# Patient Record
Sex: FEMALE | Race: WHITE | NOT HISPANIC OR LATINO | Employment: FULL TIME | URBAN - METROPOLITAN AREA
[De-identification: names, ages, dates, MRNs, and addresses within clinical notes are randomized per-mention and may not be internally consistent; named-entity substitution may affect disease eponyms.]

---

## 2018-02-09 ENCOUNTER — OFFICE VISIT (OUTPATIENT)
Dept: URGENT CARE | Facility: CLINIC | Age: 28
End: 2018-02-09
Payer: COMMERCIAL

## 2018-02-09 VITALS
OXYGEN SATURATION: 98 % | WEIGHT: 120 LBS | HEIGHT: 62 IN | TEMPERATURE: 99.9 F | DIASTOLIC BLOOD PRESSURE: 62 MMHG | HEART RATE: 74 BPM | RESPIRATION RATE: 15 BRPM | BODY MASS INDEX: 22.08 KG/M2 | SYSTOLIC BLOOD PRESSURE: 100 MMHG

## 2018-02-09 DIAGNOSIS — B34.9 ACUTE VIRAL SYNDROME: ICD-10-CM

## 2018-02-09 DIAGNOSIS — R52 BODY ACHES: ICD-10-CM

## 2018-02-09 LAB
FLUAV+FLUBV AG SPEC QL IA: NORMAL
INT CON NEG: NEGATIVE
INT CON POS: POSITIVE

## 2018-02-09 PROCEDURE — 87804 INFLUENZA ASSAY W/OPTIC: CPT | Performed by: NURSE PRACTITIONER

## 2018-02-09 PROCEDURE — 99202 OFFICE O/P NEW SF 15 MIN: CPT | Performed by: NURSE PRACTITIONER

## 2018-02-09 RX ORDER — DOXYCYCLINE HYCLATE 100 MG
100 TABLET ORAL 2 TIMES DAILY
Status: ON HOLD | COMMUNITY
End: 2022-12-28

## 2018-02-09 ASSESSMENT — ENCOUNTER SYMPTOMS
SHORTNESS OF BREATH: 0
DIARRHEA: 0
FEVER: 0
NAUSEA: 0
WHEEZING: 0
COUGH: 0
ORTHOPNEA: 0
EYE DISCHARGE: 0
CHILLS: 0
SORE THROAT: 1
MYALGIAS: 1
HEADACHES: 0

## 2018-02-09 ASSESSMENT — PATIENT HEALTH QUESTIONNAIRE - PHQ9: CLINICAL INTERPRETATION OF PHQ2 SCORE: 0

## 2018-02-10 NOTE — PROGRESS NOTES
Subjective:      Magaly Baker is a 27 y.o. female who presents with Flu Like Symptoms (fatigue, body aches, headaches,chills x1days)            HPI New problem. 27 year old female with fatigue and body aches for one day. She states she also has mild congestion. Denies cough, nausea or diarrhea. She has some sore throat in the morning. She took an advil PM last night.  Patient has no allergy information on record.  Current Outpatient Prescriptions on File Prior to Visit   Medication Sig Dispense Refill   • DiphenhydrAMINE HCl (BENADRYL ALLERGY PO) Take  by mouth.     • amoxicillin-clavulanate (AUGMENTIN) 875-125 MG TABS Take 1 Tab by mouth every 12 hours. Take with food. 20 Each 0   • fluticasone (FLONASE) 50 MCG/ACT nasal spray Spray 2 Sprays in nose every day. 1 Bottle 0   • Saline (SIMPLY SALINE) 0.9 % AERS Spray 2 Sprays in nose 4 times a day as needed. 1 Can prn     No current facility-administered medications on file prior to visit.      Social History     Social History   • Marital status: Single     Spouse name: N/A   • Number of children: N/A   • Years of education: N/A     Occupational History   • Not on file.     Social History Main Topics   • Smoking status: Never Smoker   • Smokeless tobacco: Never Used   • Alcohol use No   • Drug use: No   • Sexual activity: Not on file     Other Topics Concern   • Not on file     Social History Narrative   • No narrative on file     family history is not on file.      Review of Systems   Constitutional: Positive for malaise/fatigue. Negative for chills and fever.   HENT: Positive for congestion and sore throat.    Eyes: Negative for discharge.   Respiratory: Negative for cough, shortness of breath and wheezing.    Cardiovascular: Negative for chest pain and orthopnea.   Gastrointestinal: Negative for diarrhea and nausea.   Musculoskeletal: Positive for myalgias.   Neurological: Negative for headaches.   Endo/Heme/Allergies: Negative for environmental allergies.  "         Objective:     /62   Pulse 74   Temp 37.7 °C (99.9 °F)   Resp 15   Ht 1.575 m (5' 2\")   Wt 54.4 kg (120 lb)   SpO2 98%   BMI 21.95 kg/m²      Physical Exam   Constitutional: She is oriented to person, place, and time. She appears well-developed and well-nourished. No distress.   HENT:   Head: Normocephalic and atraumatic.   Right Ear: External ear and ear canal normal. Tympanic membrane is not injected and not perforated. No middle ear effusion.   Left Ear: External ear and ear canal normal. Tympanic membrane is not injected and not perforated.  No middle ear effusion.   Nose: No mucosal edema.   Mouth/Throat: No oropharyngeal exudate or posterior oropharyngeal erythema.   Eyes: Conjunctivae are normal. Right eye exhibits no discharge. Left eye exhibits no discharge.   Neck: Normal range of motion. Neck supple.   Cardiovascular: Normal rate, regular rhythm and normal heart sounds.    No murmur heard.  Pulmonary/Chest: Effort normal and breath sounds normal. No respiratory distress.   Musculoskeletal: Normal range of motion.   Normal movement of all 4 extremities.   Lymphadenopathy:     She has no cervical adenopathy.        Right: No supraclavicular adenopathy present.        Left: No supraclavicular adenopathy present.   Neurological: She is alert and oriented to person, place, and time. Gait normal.   Skin: Skin is warm and dry.   Psychiatric: She has a normal mood and affect. Her behavior is normal. Thought content normal.   Nursing note and vitals reviewed.              Assessment/Plan:     1. Acute viral syndrome     2. Body aches  POCT Influenza A/B     Negative flu  Viral syndrome  Symptomatic care  Differential diagnosis, natural history, supportive care, and indications for immediate follow-up discussed at length.         "

## 2018-11-14 ENCOUNTER — HOSPITAL ENCOUNTER (OUTPATIENT)
Facility: MEDICAL CENTER | Age: 28
End: 2018-11-14
Payer: COMMERCIAL

## 2018-11-15 LAB
CHOLEST SERPL-MCNC: 174 MG/DL (ref 100–199)
FASTING STATUS PATIENT QL REPORTED: NORMAL
GLUCOSE SERPL-MCNC: 75 MG/DL (ref 65–99)
HDLC SERPL-MCNC: 51 MG/DL
LDLC SERPL CALC-MCNC: 114 MG/DL
TRIGL SERPL-MCNC: 45 MG/DL (ref 0–149)

## 2019-09-05 ENCOUNTER — OFFICE VISIT (OUTPATIENT)
Dept: URGENT CARE | Facility: CLINIC | Age: 29
End: 2019-09-05
Payer: COMMERCIAL

## 2019-09-05 VITALS
HEIGHT: 62 IN | BODY MASS INDEX: 23.37 KG/M2 | WEIGHT: 127 LBS | HEART RATE: 72 BPM | DIASTOLIC BLOOD PRESSURE: 58 MMHG | RESPIRATION RATE: 16 BRPM | OXYGEN SATURATION: 95 % | SYSTOLIC BLOOD PRESSURE: 102 MMHG | TEMPERATURE: 98.7 F

## 2019-09-05 DIAGNOSIS — H66.001 ACUTE SUPPURATIVE OTITIS MEDIA OF RIGHT EAR WITHOUT SPONTANEOUS RUPTURE OF TYMPANIC MEMBRANE, RECURRENCE NOT SPECIFIED: ICD-10-CM

## 2019-09-05 DIAGNOSIS — J00 NASOPHARYNGITIS ACUTE: ICD-10-CM

## 2019-09-05 PROCEDURE — 99214 OFFICE O/P EST MOD 30 MIN: CPT | Performed by: PHYSICIAN ASSISTANT

## 2019-09-05 RX ORDER — AMOXICILLIN 875 MG/1
875 TABLET, COATED ORAL 2 TIMES DAILY
Qty: 14 TAB | Refills: 0 | Status: SHIPPED | OUTPATIENT
Start: 2019-09-05 | End: 2019-09-12

## 2019-09-05 ASSESSMENT — PATIENT HEALTH QUESTIONNAIRE - PHQ9: CLINICAL INTERPRETATION OF PHQ2 SCORE: 0

## 2019-09-05 ASSESSMENT — ENCOUNTER SYMPTOMS
FEVER: 0
DIZZINESS: 0
SORE THROAT: 1
CHILLS: 0

## 2019-09-05 NOTE — PROGRESS NOTES
"Subjective:   Magaly Baker is a 28 y.o. female who presents for Otalgia (right ear, left ear hurts but not as much, sore throat x 2 days)    This is a new problem.  Patient presents to urgent care with 2-day history of ear pain right greater than left with sore throat.  Patient denies any fever or chills.  She reports a fullness in the right ear with slight decrease in hearing.  Denies tinnitus.  Has had no drainage.      Otalgia    Associated symptoms include hearing loss and a sore throat.     Review of Systems   Constitutional: Negative for chills, fever and malaise/fatigue.   HENT: Positive for ear pain, hearing loss and sore throat. Negative for tinnitus.    Neurological: Negative for dizziness.   All other systems reviewed and are negative.    No Known Allergies     Objective:   /58 (BP Location: Right arm, Patient Position: Sitting, BP Cuff Size: Adult)   Pulse 72   Temp 37.1 °C (98.7 °F) (Temporal)   Resp 16   Ht 1.575 m (5' 2\")   Wt 57.6 kg (127 lb)   SpO2 95%   BMI 23.23 kg/m²   Physical Exam   Constitutional: She is oriented to person, place, and time. She appears well-developed and well-nourished. She does not appear ill. No distress.   HENT:   Head: Normocephalic and atraumatic.   Right Ear: External ear and ear canal normal. Tympanic membrane is injected. Tympanic membrane is not retracted and not bulging. A middle ear effusion is present.   Left Ear: Tympanic membrane, external ear and ear canal normal.   Nose: Mucosal edema present.   Mouth/Throat: Uvula is midline and mucous membranes are normal. Posterior oropharyngeal erythema present. No oropharyngeal exudate. Tonsils are 0 on the right. Tonsils are 0 on the left.   Eyes: Pupils are equal, round, and reactive to light. Conjunctivae and EOM are normal.   Neck: Normal range of motion. Neck supple.   Cardiovascular: Normal rate, regular rhythm and normal heart sounds. Exam reveals no gallop and no friction rub.   No murmur " heard.  Pulmonary/Chest: Effort normal and breath sounds normal. No respiratory distress. She has no wheezes. She has no rales.   Abdominal: Soft. Bowel sounds are normal. She exhibits no distension and no mass. There is no tenderness. There is no rebound and no guarding.   Musculoskeletal: Normal range of motion. She exhibits no edema, tenderness or deformity.   Lymphadenopathy:        Head (right side): No submental, no submandibular and no tonsillar adenopathy present.        Head (left side): No submental, no submandibular and no tonsillar adenopathy present.     She has no cervical adenopathy.        Right: No supraclavicular adenopathy present.        Left: No supraclavicular adenopathy present.   Neurological: She is alert and oriented to person, place, and time. She has normal strength. No cranial nerve deficit or sensory deficit. Coordination normal.   Skin: Skin is warm and dry. No rash noted.   Psychiatric: She has a normal mood and affect. Judgment normal.   Vitals reviewed.          Assessment/Plan:   Assessment    1. Acute suppurative otitis media of right ear without spontaneous rupture of tympanic membrane, recurrence not specified  - amoxicillin (AMOXIL) 875 MG tablet; Take 1 Tab by mouth 2 times a day for 7 days.  Dispense: 14 Tab; Refill: 0    2. Nasopharyngitis acute    Patient will be treated with amoxicillin as above.  Recommend ibuprofen or Tylenol as needed for pain not to exceed recommended daily dose.  Symptom medic, supportive care.  Increase fluids, rest.  Ice pops, cool fluids, salt water gargles.    Differential diagnosis, natural history, supportive care, and indications for immediate follow-up discussed.    If not improving in 3-5 days, F/U with PCP or return to  or sooner if worsens  Red flag warning symptoms and strict ER/follow-up precautions given.  The patient demonstrated a good understanding and agreed with the treatment plan.  Please note that this note was created using  voice recognition speech to text software. Every effort has been made to correct obvious errors.  However, I expect there are errors of grammar and possibly context that were not discovered prior to finalizing the note  SEddie Merchant PA-C

## 2019-12-19 ENCOUNTER — OFFICE VISIT (OUTPATIENT)
Dept: URGENT CARE | Facility: CLINIC | Age: 29
End: 2019-12-19
Payer: COMMERCIAL

## 2019-12-19 VITALS
TEMPERATURE: 98.7 F | DIASTOLIC BLOOD PRESSURE: 60 MMHG | OXYGEN SATURATION: 98 % | BODY MASS INDEX: 22.08 KG/M2 | RESPIRATION RATE: 16 BRPM | HEIGHT: 62 IN | WEIGHT: 120 LBS | HEART RATE: 88 BPM | SYSTOLIC BLOOD PRESSURE: 110 MMHG

## 2019-12-19 DIAGNOSIS — R68.89 FLU-LIKE SYMPTOMS: ICD-10-CM

## 2019-12-19 DIAGNOSIS — J06.9 VIRAL URI WITH COUGH: ICD-10-CM

## 2019-12-19 LAB
FLUAV+FLUBV AG SPEC QL IA: NEGATIVE
INT CON NEG: NORMAL
INT CON POS: NORMAL

## 2019-12-19 PROCEDURE — 99213 OFFICE O/P EST LOW 20 MIN: CPT | Performed by: NURSE PRACTITIONER

## 2019-12-19 PROCEDURE — 87804 INFLUENZA ASSAY W/OPTIC: CPT | Performed by: NURSE PRACTITIONER

## 2019-12-19 RX ORDER — OSELTAMIVIR PHOSPHATE 75 MG/1
75 CAPSULE ORAL 2 TIMES DAILY
Qty: 10 CAP | Refills: 0 | Status: CANCELLED | OUTPATIENT
Start: 2019-12-19

## 2019-12-19 ASSESSMENT — ENCOUNTER SYMPTOMS
NAUSEA: 0
SPUTUM PRODUCTION: 1
SHORTNESS OF BREATH: 0
DIARRHEA: 0
SORE THROAT: 1
ORTHOPNEA: 0
CHILLS: 0
FEVER: 0
WHEEZING: 0
COUGH: 1
HEADACHES: 1
MYALGIAS: 1
EYE DISCHARGE: 0

## 2019-12-19 NOTE — PROGRESS NOTES
Subjective:      Magaly Baker is a 29 y.o. female who presents with Cough (x1 day); Sore Throat; Body Aches; and Headache            HPI New. 29 year old female with cough for one day as well as sore throat, body aches and headache.  with influenza but she has had flu shot this year. She has no fever, chills, nausea or diarrhea. She has not taken any medication for this today.  Patient has no known allergies.  Current Outpatient Medications on File Prior to Visit   Medication Sig Dispense Refill   • levonorgestrel (MIRENA, 52 MG,) 20 MCG/24HR IUD 1 Each by Intrauterine route Once.     • fluticasone (FLONASE) 50 MCG/ACT nasal spray Spray 2 Sprays in nose every day. 1 Bottle 0   • doxycycline (VIBRAMYCIN) 100 MG Tab Take 100 mg by mouth 2 times a day.     • DiphenhydrAMINE HCl (BENADRYL ALLERGY PO) Take  by mouth.     • amoxicillin-clavulanate (AUGMENTIN) 875-125 MG TABS Take 1 Tab by mouth every 12 hours. Take with food. (Patient not taking: Reported on 9/5/2019) 20 Each 0   • Saline (SIMPLY SALINE) 0.9 % AERS Spray 2 Sprays in nose 4 times a day as needed. (Patient not taking: Reported on 9/5/2019) 1 Can prn     No current facility-administered medications on file prior to visit.      Social History     Socioeconomic History   • Marital status: Single     Spouse name: Not on file   • Number of children: Not on file   • Years of education: Not on file   • Highest education level: Not on file   Occupational History   • Not on file   Social Needs   • Financial resource strain: Not on file   • Food insecurity:     Worry: Not on file     Inability: Not on file   • Transportation needs:     Medical: Not on file     Non-medical: Not on file   Tobacco Use   • Smoking status: Never Smoker   • Smokeless tobacco: Never Used   Substance and Sexual Activity   • Alcohol use: Yes     Comment: rarely   • Drug use: No   • Sexual activity: Yes     Partners: Male     Birth control/protection: I.U.D.   Lifestyle   • Physical  "activity:     Days per week: Not on file     Minutes per session: Not on file   • Stress: Not on file   Relationships   • Social connections:     Talks on phone: Not on file     Gets together: Not on file     Attends [employer] service: Not on file     Active member of club or organization: Not on file     Attends meetings of clubs or organizations: Not on file     Relationship status: Not on file   • Intimate partner violence:     Fear of current or ex partner: Not on file     Emotionally abused: Not on file     Physically abused: Not on file     Forced sexual activity: Not on file   Other Topics Concern   • Not on file   Social History Narrative   • Not on file     Breast Cancer-related family history is not on file.      Review of Systems   Constitutional: Positive for malaise/fatigue. Negative for chills and fever.   HENT: Positive for sore throat. Negative for congestion.    Eyes: Negative for discharge.   Respiratory: Positive for cough and sputum production. Negative for shortness of breath and wheezing.    Cardiovascular: Negative for chest pain and orthopnea.   Gastrointestinal: Negative for diarrhea and nausea.   Musculoskeletal: Positive for myalgias.   Neurological: Positive for headaches.   Endo/Heme/Allergies: Negative for environmental allergies.          Objective:     /60 (BP Location: Right arm, Patient Position: Sitting, BP Cuff Size: Adult)   Pulse 88   Temp 37.1 °C (98.7 °F) (Temporal)   Resp 16   Ht 1.575 m (5' 2\")   Wt 54.4 kg (120 lb)   LMP 12/18/2019 (Exact Date)   SpO2 98%   Breastfeeding? No   BMI 21.95 kg/m²      Physical Exam  Vitals signs and nursing note reviewed.   Constitutional:       General: She is not in acute distress.     Appearance: She is well-developed.   HENT:      Head: Normocephalic and atraumatic.      Right Ear: Tympanic membrane, ear canal and external ear normal. No middle ear effusion. Tympanic membrane is not injected or perforated.      Left Ear: " Tympanic membrane, ear canal and external ear normal.  No middle ear effusion. Tympanic membrane is not injected or perforated.      Nose: Mucosal edema and rhinorrhea present. No congestion.      Mouth/Throat:      Pharynx: No oropharyngeal exudate or posterior oropharyngeal erythema.   Eyes:      General:         Right eye: No discharge.         Left eye: No discharge.      Conjunctiva/sclera: Conjunctivae normal.   Neck:      Musculoskeletal: Normal range of motion and neck supple.   Cardiovascular:      Rate and Rhythm: Normal rate and regular rhythm.      Heart sounds: Normal heart sounds. No murmur.   Pulmonary:      Effort: Pulmonary effort is normal. No respiratory distress.      Breath sounds: Normal breath sounds.   Musculoskeletal: Normal range of motion.      Comments: Normal movement of all 4 extremities.   Lymphadenopathy:      Cervical: No cervical adenopathy.      Upper Body:      Right upper body: No supraclavicular adenopathy.      Left upper body: No supraclavicular adenopathy.   Skin:     General: Skin is warm and dry.   Neurological:      Mental Status: She is alert and oriented to person, place, and time.      Gait: Gait normal.   Psychiatric:         Behavior: Behavior normal.         Thought Content: Thought content normal.                 Assessment/Plan:       1. Viral URI with cough     2. Flu-like symptoms  POCT Influenza A/B     Flu negative. Likely a cold at this time  Viral illness at this time with no indication for antibiotics. Reviewed with patient expected course of illness and also reviewed OTC medications that may be used for symptom relief. Follow up 7-10 days if not improving.

## 2021-12-03 ENCOUNTER — APPOINTMENT (OUTPATIENT)
Dept: URGENT CARE | Facility: CLINIC | Age: 31
End: 2021-12-03
Payer: COMMERCIAL

## 2021-12-03 ENCOUNTER — HOSPITAL ENCOUNTER (EMERGENCY)
Facility: MEDICAL CENTER | Age: 31
End: 2021-12-03
Attending: EMERGENCY MEDICINE
Payer: COMMERCIAL

## 2021-12-03 VITALS
TEMPERATURE: 98.3 F | WEIGHT: 125 LBS | BODY MASS INDEX: 22.86 KG/M2 | DIASTOLIC BLOOD PRESSURE: 66 MMHG | HEART RATE: 88 BPM | SYSTOLIC BLOOD PRESSURE: 100 MMHG | RESPIRATION RATE: 16 BRPM | OXYGEN SATURATION: 99 %

## 2021-12-03 DIAGNOSIS — A08.4 VIRAL GASTROENTERITIS: ICD-10-CM

## 2021-12-03 LAB
ALBUMIN SERPL BCP-MCNC: 4.5 G/DL (ref 3.2–4.9)
ALBUMIN/GLOB SERPL: 1.9 G/DL
ALP SERPL-CCNC: 42 U/L (ref 30–99)
ALT SERPL-CCNC: 23 U/L (ref 2–50)
ANION GAP SERPL CALC-SCNC: 13 MMOL/L (ref 7–16)
APPEARANCE UR: CLEAR
AST SERPL-CCNC: 22 U/L (ref 12–45)
BASOPHILS # BLD AUTO: 0.7 % (ref 0–1.8)
BASOPHILS # BLD: 0.03 K/UL (ref 0–0.12)
BILIRUB SERPL-MCNC: 1 MG/DL (ref 0.1–1.5)
BILIRUB UR QL STRIP.AUTO: NEGATIVE
BUN SERPL-MCNC: 9 MG/DL (ref 8–22)
CALCIUM SERPL-MCNC: 9.2 MG/DL (ref 8.4–10.2)
CHLORIDE SERPL-SCNC: 103 MMOL/L (ref 96–112)
CO2 SERPL-SCNC: 23 MMOL/L (ref 20–33)
COLOR UR: YELLOW
CREAT SERPL-MCNC: 0.68 MG/DL (ref 0.5–1.4)
EOSINOPHIL # BLD AUTO: 0.03 K/UL (ref 0–0.51)
EOSINOPHIL NFR BLD: 0.7 % (ref 0–6.9)
EPI CELLS #/AREA URNS HPF: NORMAL /HPF
ERYTHROCYTE [DISTWIDTH] IN BLOOD BY AUTOMATED COUNT: 41.7 FL (ref 35.9–50)
GLOBULIN SER CALC-MCNC: 2.4 G/DL (ref 1.9–3.5)
GLUCOSE SERPL-MCNC: 89 MG/DL (ref 65–99)
GLUCOSE UR STRIP.AUTO-MCNC: NEGATIVE MG/DL
HCG SERPL QL: NEGATIVE
HCT VFR BLD AUTO: 48 % (ref 37–47)
HGB BLD-MCNC: 15.8 G/DL (ref 12–16)
IMM GRANULOCYTES # BLD AUTO: 0.02 K/UL (ref 0–0.11)
IMM GRANULOCYTES NFR BLD AUTO: 0.5 % (ref 0–0.9)
KETONES UR STRIP.AUTO-MCNC: NEGATIVE MG/DL
LEUKOCYTE ESTERASE UR QL STRIP.AUTO: NEGATIVE
LIPASE SERPL-CCNC: 19 U/L (ref 7–58)
LYMPHOCYTES # BLD AUTO: 0.57 K/UL (ref 1–4.8)
LYMPHOCYTES NFR BLD: 13 % (ref 22–41)
MCH RBC QN AUTO: 29.6 PG (ref 27–33)
MCHC RBC AUTO-ENTMCNC: 32.9 G/DL (ref 33.6–35)
MCV RBC AUTO: 90.1 FL (ref 81.4–97.8)
MICRO URNS: ABNORMAL
MONOCYTES # BLD AUTO: 0.44 K/UL (ref 0–0.85)
MONOCYTES NFR BLD AUTO: 10 % (ref 0–13.4)
NEUTROPHILS # BLD AUTO: 3.29 K/UL (ref 2–7.15)
NEUTROPHILS NFR BLD: 75.1 % (ref 44–72)
NITRITE UR QL STRIP.AUTO: NEGATIVE
NRBC # BLD AUTO: 0 K/UL
NRBC BLD-RTO: 0 /100 WBC
PH UR STRIP.AUTO: 6 [PH] (ref 5–8)
PLATELET # BLD AUTO: 171 K/UL (ref 164–446)
PMV BLD AUTO: 10.7 FL (ref 9–12.9)
POTASSIUM SERPL-SCNC: 4 MMOL/L (ref 3.6–5.5)
PROT SERPL-MCNC: 6.9 G/DL (ref 6–8.2)
PROT UR QL STRIP: NEGATIVE MG/DL
RBC # BLD AUTO: 5.33 M/UL (ref 4.2–5.4)
RBC # URNS HPF: NORMAL /HPF
RBC UR QL AUTO: ABNORMAL
SODIUM SERPL-SCNC: 139 MMOL/L (ref 135–145)
SP GR UR STRIP.AUTO: <=1.005
WBC # BLD AUTO: 4.4 K/UL (ref 4.8–10.8)

## 2021-12-03 PROCEDURE — 83690 ASSAY OF LIPASE: CPT

## 2021-12-03 PROCEDURE — 84703 CHORIONIC GONADOTROPIN ASSAY: CPT

## 2021-12-03 PROCEDURE — 80053 COMPREHEN METABOLIC PANEL: CPT

## 2021-12-03 PROCEDURE — 99284 EMERGENCY DEPT VISIT MOD MDM: CPT

## 2021-12-03 PROCEDURE — 36415 COLL VENOUS BLD VENIPUNCTURE: CPT

## 2021-12-03 PROCEDURE — 85025 COMPLETE CBC W/AUTO DIFF WBC: CPT

## 2021-12-03 PROCEDURE — 700105 HCHG RX REV CODE 258: Performed by: EMERGENCY MEDICINE

## 2021-12-03 PROCEDURE — 81001 URINALYSIS AUTO W/SCOPE: CPT

## 2021-12-03 RX ORDER — ONDANSETRON 4 MG/1
4 TABLET, ORALLY DISINTEGRATING ORAL EVERY 6 HOURS PRN
Qty: 10 TABLET | Refills: 0 | Status: ON HOLD | OUTPATIENT
Start: 2021-12-03 | End: 2022-12-28

## 2021-12-03 RX ORDER — SODIUM CHLORIDE 9 MG/ML
INJECTION, SOLUTION INTRAVENOUS CONTINUOUS
Status: DISCONTINUED | OUTPATIENT
Start: 2021-12-03 | End: 2021-12-03 | Stop reason: HOSPADM

## 2021-12-03 RX ADMIN — SODIUM CHLORIDE: 9 INJECTION, SOLUTION INTRAVENOUS at 14:37

## 2021-12-03 ASSESSMENT — FIBROSIS 4 INDEX
FIB4 SCORE: 0.78
FIB4 SCORE: 0.78

## 2021-12-03 ASSESSMENT — PAIN DESCRIPTION - PAIN TYPE: TYPE: ACUTE PAIN

## 2021-12-03 NOTE — ED TRIAGE NOTES
Chief Complaint   Patient presents with   • Headache     started yesterday AM, c/o fatigue   • Abdominal Pain     started yesterday, c/o diarrhea - dark in color; chills, nausea     /79   Pulse 81   Temp 37.2 °C (99 °F) (Temporal)   Resp 18   SpO2 99%     Pt arrived w/ above concern    Has this patient been vaccinated for COVID - YES  If not, would they like to be vaccinated while in the ER if eligible?  n/a  Would the patient like to speak with the ERP about the possibility of receiving the COVID vaccine today before making a decision? n/a

## 2021-12-03 NOTE — ED PROVIDER NOTES
ED Provider Note    CHIEF COMPLAINT  Chief Complaint   Patient presents with   • Headache     started yesterday AM, c/o fatigue   • Abdominal Pain     started yesterday, c/o diarrhea - dark in color; chills, nausea       HPI  Magaly Baker is a 31 y.o. female who presents with abdominal pain.  The patient says she is been sick over the last 2 days.  She states she has cramping abdominal discomfort.  She also has associated diarrhea.  She says she has noticed some possible blood in her diarrhea and states the diarrhea is very dark.  She says she also has some generalized malaise.  She has a mild diffuse headache.  She has not had any associated fevers.  She denies difficulty with urination.  She is unaware of any sick contacts.  She states that she is vaccinated.    REVIEW OF SYSTEMS  See HPI for further details. All other systems are negative.     PAST MEDICAL HISTORY  No past medical history on file.    F[facility] HISTORY  [facility]    SOCIAL HISTORY  Social History     Socioeconomic History   • Marital status: Single     Spouse name: Not on file   • Number of children: Not on file   • Years of education: Not on file   • Highest education level: Not on file   Occupational History   • Not on file   Tobacco Use   • Smoking status: Never Smoker   • Smokeless tobacco: Never Used   Vaping Use   • Vaping Use: Never used   Substance and Sexual Activity   • Alcohol use: Yes     Comment: rarely   • Drug use: No   • Sexual activity: Yes     Partners: Male     Birth control/protection: I.U.D.   Other Topics Concern   • Not on file   Social History Narrative   • Not on file     Social Determinants of Health     Financial Resource Strain:    • Difficulty of Paying Living Expenses: Not on file   Food Insecurity:    • Worried About Running Out of Food in the Last Year: Not on file   • Ran Out of Food in the Last Year: Not on file   Transportation Needs:    • Lack of Transportation (Medical): Not on file   • Lack of Transportation  (Non-Medical): Not on file   Physical Activity:    • Days of Exercise per Week: Not on file   • Minutes of Exercise per Session: Not on file   Stress:    • Feeling of Stress : Not on file   Social Connections:    • Frequency of Communication with Friends and Family: Not on file   • Frequency of Social Gatherings with Friends and Family: Not on file   • Attends Yazdanism Services: Not on file   • Active Member of Clubs or Organizations: Not on file   • Attends Club or Organization Meetings: Not on file   • Marital Status: Not on file   Intimate Partner Violence:    • Fear of Current or Ex-Partner: Not on file   • Emotionally Abused: Not on file   • Physically Abused: Not on file   • Sexually Abused: Not on file   Housing Stability:    • Unable to Pay for Housing in the Last Year: Not on file   • Number of Places Lived in the Last Year: Not on file   • Unstable Housing in the Last Year: Not on file       SURGICAL HISTORY  No past surgical history on file.    CURRENT MEDICATIONS  Home Medications     Reviewed by Marley Pacheco R.N. (Registered Nurse) on 12/03/21 at 1406  Med List Status: Not Addressed   Medication Last Dose Status   amoxicillin-clavulanate (AUGMENTIN) 875-125 MG TABS  Active   DiphenhydrAMINE HCl (BENADRYL ALLERGY PO)  Active   doxycycline (VIBRAMYCIN) 100 MG Tab  Active   fluticasone (FLONASE) 50 MCG/ACT nasal spray  Active   levonorgestrel (MIRENA, 52 MG,) 20 MCG/24HR IUD  Active   Saline (SIMPLY SALINE) 0.9 % AERS  Active                ALLERGIES  No Known Allergies    PHYSICAL EXAM  VITAL SIGNS: /79   Pulse 81   Temp 37.2 °C (99 °F) (Temporal)   Resp 18   SpO2 99%       Constitutional: Well developed, Well nourished, No acute distress, Non-toxic appearance.   HENT: Normocephalic, Atraumatic, Bilateral external ears normal, Oropharynx dry, No oral exudates, Nose normal.   Eyes: PERRLA, EOMI, Conjunctiva normal, No discharge.   Neck: Normal range of motion, No tenderness, Supple, No  stridor.   Lymphatic: No lymphadenopathy noted.   Cardiovascular: Normal heart rate, Normal rhythm, No murmurs, No rubs, No gallops.   Thorax & Lungs: Normal breath sounds, No respiratory distress, No wheezing, No chest tenderness.   Abdomen: Bowel sounds normal, Soft, No tenderness, No masses, No pulsatile masses.   Skin: Warm, Dry, No erythema, No rash.   Back: No tenderness, No CVA tenderness.   Extremities: Intact distal pulses, No edema, No tenderness, No cyanosis, No clubbing.   Neurologic: Alert & oriented x 3, Normal motor function, Normal sensory function, No focal deficits noted.   Psychiatric: Affect normal, Judgment normal, Mood normal.     Results for orders placed or performed during the hospital encounter of 12/03/21   CBC WITH DIFFERENTIAL   Result Value Ref Range    WBC 4.4 (L) 4.8 - 10.8 K/uL    RBC 5.33 4.20 - 5.40 M/uL    Hemoglobin 15.8 12.0 - 16.0 g/dL    Hematocrit 48.0 (H) 37.0 - 47.0 %    MCV 90.1 81.4 - 97.8 fL    MCH 29.6 27.0 - 33.0 pg    MCHC 32.9 (L) 33.6 - 35.0 g/dL    RDW 41.7 35.9 - 50.0 fL    Platelet Count 171 164 - 446 K/uL    MPV 10.7 9.0 - 12.9 fL    Neutrophils-Polys 75.10 (H) 44.00 - 72.00 %    Lymphocytes 13.00 (L) 22.00 - 41.00 %    Monocytes 10.00 0.00 - 13.40 %    Eosinophils 0.70 0.00 - 6.90 %    Basophils 0.70 0.00 - 1.80 %    Immature Granulocytes 0.50 0.00 - 0.90 %    Nucleated RBC 0.00 /100 WBC    Neutrophils (Absolute) 3.29 2.00 - 7.15 K/uL    Lymphs (Absolute) 0.57 (L) 1.00 - 4.80 K/uL    Monos (Absolute) 0.44 0.00 - 0.85 K/uL    Eos (Absolute) 0.03 0.00 - 0.51 K/uL    Baso (Absolute) 0.03 0.00 - 0.12 K/uL    Immature Granulocytes (abs) 0.02 0.00 - 0.11 K/uL    NRBC (Absolute) 0.00 K/uL   COMP METABOLIC PANEL   Result Value Ref Range    Sodium 139 135 - 145 mmol/L    Potassium 4.0 3.6 - 5.5 mmol/L    Chloride 103 96 - 112 mmol/L    Co2 23 20 - 33 mmol/L    Anion Gap 13.0 7.0 - 16.0    Glucose 89 65 - 99 mg/dL    Bun 9 8 - 22 mg/dL    Creatinine 0.68 0.50 - 1.40  mg/dL    Calcium 9.2 8.4 - 10.2 mg/dL    AST(SGOT) 22 12 - 45 U/L    ALT(SGPT) 23 2 - 50 U/L    Alkaline Phosphatase 42 30 - 99 U/L    Total Bilirubin 1.0 0.1 - 1.5 mg/dL    Albumin 4.5 3.2 - 4.9 g/dL    Total Protein 6.9 6.0 - 8.2 g/dL    Globulin 2.4 1.9 - 3.5 g/dL    A-G Ratio 1.9 g/dL   LIPASE   Result Value Ref Range    Lipase 19 7 - 58 U/L   URINALYSIS,CULTURE IF INDICATED    Specimen: Urine   Result Value Ref Range    Color Yellow     Character Clear     Specific Gravity <=1.005 <1.035    Ph 6.0 5.0 - 8.0    Glucose Negative Negative mg/dL    Ketones Negative Negative mg/dL    Protein Negative Negative mg/dL    Bilirubin Negative Negative    Nitrite Negative Negative    Leukocyte Esterase Negative Negative    Occult Blood Trace (A) Negative    Micro Urine Req Microscopic    HCG QUAL SERUM   Result Value Ref Range    Beta-Hcg Qualitative Serum Negative Negative   URINE MICROSCOPIC (W/UA)   Result Value Ref Range    RBC Rare /hpf    Epithelial Cells Rare Few /hpf   ESTIMATED GFR   Result Value Ref Range    GFR If African American >60 >60 mL/min/1.73 m 2    GFR If Non African American >60 >60 mL/min/1.73 m 2       COURSE & MEDICAL DECISION MAKING  Pertinent Labs & Imaging studies reviewed. (See chart for details)  This a 31-year-old female who presents with cramping abdominal pain, diarrhea, and a headache.  I suspect this is from a viral source.  The patient clinically does not appear toxic.  Her abdomen is benign.  The patient does not have any urinary symptoms.  She did appear dehydrated on arrival therefore an IV was established and started infusing IV fluids at 250 cc an hour.  Urinalysis does not show any evidence of urinary tract infection.  Metabolic panel does not show any evidence of metabolic derangement.  The patient is not pregnant.  We will discharge her home with instructions to drink lots of fluids and will give her prescription for Zofran.  She will return for persistent vomiting.    FINAL  IMPRESSION  1.  Headache   2. Abdominal pain  3.  Diarrhea  4.  Suspect secondary to viral illness    Disposition  Patient will be discharged in stable condition         Electronically signed by: Isacc Hunt M.D., 12/3/2021 2:26 PM

## 2022-07-05 ENCOUNTER — HOSPITAL ENCOUNTER (OUTPATIENT)
Dept: LAB | Facility: MEDICAL CENTER | Age: 32
End: 2022-07-05
Attending: OBSTETRICS & GYNECOLOGY
Payer: COMMERCIAL

## 2022-07-05 LAB — B-HCG SERPL-ACNC: ABNORMAL MIU/ML (ref 0–10)

## 2022-07-05 PROCEDURE — 36415 COLL VENOUS BLD VENIPUNCTURE: CPT

## 2022-07-05 PROCEDURE — 84702 CHORIONIC GONADOTROPIN TEST: CPT

## 2022-12-27 ENCOUNTER — HOSPITAL ENCOUNTER (OUTPATIENT)
Facility: MEDICAL CENTER | Age: 32
End: 2022-12-28
Attending: OBSTETRICS & GYNECOLOGY | Admitting: OBSTETRICS & GYNECOLOGY
Payer: COMMERCIAL

## 2022-12-27 ENCOUNTER — APPOINTMENT (OUTPATIENT)
Dept: RADIOLOGY | Facility: MEDICAL CENTER | Age: 32
End: 2022-12-27
Attending: OBSTETRICS & GYNECOLOGY
Payer: COMMERCIAL

## 2022-12-27 PROBLEM — O9A.213 TRAUMATIC INJURY DURING PREGNANCY, ANTEPARTUM, THIRD TRIMESTER: Status: ACTIVE | Noted: 2022-12-27

## 2022-12-27 PROCEDURE — G0378 HOSPITAL OBSERVATION PER HR: HCPCS

## 2022-12-27 PROCEDURE — 76815 OB US LIMITED FETUS(S): CPT

## 2022-12-27 PROCEDURE — 302449 STATCHG TRIAGE ONLY (STATISTIC)

## 2022-12-27 RX ORDER — OMEPRAZOLE 20 MG/1
20 CAPSULE, DELAYED RELEASE ORAL DAILY
Status: ON HOLD | COMMUNITY
End: 2022-12-28

## 2022-12-27 RX ORDER — MAGNESIUM OXIDE 400 MG/1
400 TABLET ORAL DAILY
Status: ON HOLD | COMMUNITY
End: 2022-12-28

## 2022-12-27 ASSESSMENT — LIFESTYLE VARIABLES
EVER HAD A DRINK FIRST THING IN THE MORNING TO STEADY YOUR NERVES TO GET RID OF A HANGOVER: NO
EVER_SMOKED: NEVER
CONSUMPTION TOTAL: NEGATIVE
TOTAL SCORE: 0
EVER FELT BAD OR GUILTY ABOUT YOUR DRINKING: NO
HOW MANY TIMES IN THE PAST YEAR HAVE YOU HAD 5 OR MORE DRINKS IN A DAY: 0
AVERAGE NUMBER OF DAYS PER WEEK YOU HAVE A DRINK CONTAINING ALCOHOL: 0
HAVE YOU EVER FELT YOU SHOULD CUT DOWN ON YOUR DRINKING: NO
TOTAL SCORE: 0
ON A TYPICAL DAY WHEN YOU DRINK ALCOHOL HOW MANY DRINKS DO YOU HAVE: 0
HAVE PEOPLE ANNOYED YOU BY CRITICIZING YOUR DRINKING: NO
TOTAL SCORE: 0
ALCOHOL_USE: NO

## 2022-12-27 ASSESSMENT — COPD QUESTIONNAIRES
DO YOU EVER COUGH UP ANY MUCUS OR PHLEGM?: NO/ONLY WITH OCCASIONAL COLDS OR INFECTIONS
IN THE PAST 12 MONTHS DO YOU DO LESS THAN YOU USED TO BECAUSE OF YOUR BREATHING PROBLEMS: DISAGREE/UNSURE
DURING THE PAST 4 WEEKS HOW MUCH DID YOU FEEL SHORT OF BREATH: NONE/LITTLE OF THE TIME
HAVE YOU SMOKED AT LEAST 100 CIGARETTES IN YOUR ENTIRE LIFE: NO/DON'T KNOW

## 2022-12-27 ASSESSMENT — PATIENT HEALTH QUESTIONNAIRE - PHQ9
2. FEELING DOWN, DEPRESSED, IRRITABLE, OR HOPELESS: NOT AT ALL
1. LITTLE INTEREST OR PLEASURE IN DOING THINGS: NOT AT ALL
SUM OF ALL RESPONSES TO PHQ9 QUESTIONS 1 AND 2: 0

## 2022-12-27 ASSESSMENT — FIBROSIS 4 INDEX: FIB4 SCORE: 0.8

## 2022-12-27 NOTE — PROGRESS NOTES
1350 Pt presents to L&D stating that she had fallen around 1300. Pt states she fell on her knees and then onto her belly; pt reports not feeling the baby move. Oriented to LDA 2, EFM applied, VS, S. Fob at bedside, supportive.   1420 Report to RUBEN Harding RN.

## 2022-12-28 VITALS
BODY MASS INDEX: 31.1 KG/M2 | WEIGHT: 169 LBS | HEART RATE: 76 BPM | OXYGEN SATURATION: 96 % | RESPIRATION RATE: 15 BRPM | HEIGHT: 62 IN | DIASTOLIC BLOOD PRESSURE: 61 MMHG | SYSTOLIC BLOOD PRESSURE: 114 MMHG | TEMPERATURE: 97.3 F

## 2022-12-28 PROCEDURE — G0378 HOSPITAL OBSERVATION PER HR: HCPCS

## 2022-12-28 NOTE — DISCHARGE SUMMARY
"Discharge Summary    Admission Date: 12/27/2022    Discharge Date: 12/28/2022    Diagnosis:  IUP at 31 weeks  S/p fall with abdominal trauma    Procedures:  None     Subjective: Pt doing well. Denies pain. No VB, CTX, or LOF today. Eating, voiding and ambulating without difficulty. Desires discharge home today.    /61   Pulse 76   Temp 36.3 °C (97.3 °F) (Temporal)   Resp 15   Ht 1.575 m (5' 2\")   Wt 76.7 kg (169 lb)   SpO2 96%   BMI 30.91 kg/m²     GEN: NAD, comfortable  NECK: supple, NT  RESP: CTA bilaterally  GI: soft, NT, ND, gravid  EXT: nontender, no edema    OB MD NST Report Review:    As per my read:  NST: 140, pos accels, neg decels, moderate variability  Goessel: no ctx  Reactive NST, category 1      Hospital Course: The patient is a 33yo G1 who presented to L&D at 31 weeks gestation after she slipped and fell onto her abdomen. At the time of presentation she had a decrease in fetal movements but did not have any signs of abruption. She was admitted to antepartum for 24 hours of observation and continuous fetal monitoring. After 24 hours of fetal monitoring which was reactive and reassuring for the entire duration of her stay, she was discharged to home in a stable condition.    Discharge Instructions   1. Diet : general  2. Activity: Normal   3. FKC/labor precautions     No current outpatient medications on file.       Follow up: Next week with Dr. Healy as scheduled    Complications: none      Priya Koch M.D.  "

## 2022-12-28 NOTE — H&P
"  Labor and Delivery History and Physical    Date of Admission: 2022      ID: 32 y.o.  with IUP at 31w0d     Primary OB: Radha Healy M.D.    Attending OB: Yohannes Barnes M.D.    CC: Slip and fall in pregnancy    HPI: Magaly Baker is a 32 y.o.  at 31w0d by LMP c/w 7 week ultrasound, who presents with after a slip and fall this afternoon.  The patient was headed down a slight decline in the rain this afternoon at 1350.  She slipped over forward and landed on her knees and then on her belly.  She did impact directly on her belly.  She did feel some contractions thereafter.  She denies vaginal bleeding.  She noted decreased fetal movement after this time.  This is since improved irregular fetal movement.  She has some tenderness directly over the sites that she hit both on her knees and near the umbilicus.  She has only felt rare contractions that have decreased in frequency since the initial event.  She is noted to have an anterior placenta  Current pregnancy has been uncomplicated to this point    ROS: 10 systems reviewed and negative except as noted above.    Obstetric History    - Current, as noted in HPI      Past Medical History  Surgical History   GERD History reviewed. No pertinent surgical history.      Gynecologic History  Social History   Regular interval menses prior to pregnancy (though, prolonged cycles at 10 days duration)  Denies Hx of abnormal pap smears.  Denies Hx of STIs Tobacco: Denies  EtOH: Denies  Street Drugs: Denies  Works as a stressful job as a  for [employer]      Medications  Allergies   PNV  Omeprazole     No Known Allergies     Family Medical History   Stroke (MGM), CHF (Paternal great grandmother)       O: /63   Pulse 80   Temp 36.7 °C (98 °F) (Temporal)   Resp 18   Ht 1.575 m (5' 2\")   Wt 76.7 kg (169 lb)   SpO2 96%   BMI 30.91 kg/m²       Gen: NAD, AAO  Resp: unlabored  Abd: Gravid, mild TTP near umbilicus.  No " abrasions, no ecchymosis.  Ext: NTTP, no edema, 2+DPP  Skin: no rash, mild abrasions of knees  Pelvic: SVE deferred    NST:  135/mod minerva/+accels, variable at 17:40, not recurrent, no further repeats  Wyatt: CTX rare    Imaging:   US-OB LIMITED TRANSABDOMINAL   Final Result      Single intrauterine pregnancy of an estimated gestational age of 31 weeks, 3 days with an estimated date of delivery of 2023.      No evidence of placental abruption.      Amniotic fluid is within normal limits.          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   *Admit to L&D  *IV, CBC, T&S on hold  *Abdominal impact in pregnancy: Overall relatively mild.  Patient is not having bleeding or regular contractions.  An ultrasound was negative for signs of abruption.  The patient does have an anterior placenta.  The patient is noted to be Rh+.  Given the direct abdominal impact I have recommended the patient be observed on continuous monitoring for 24 hours from the time of the event.   -Continuous fetal monitoring until 1350 tomorrow if remains reassuring   -Monitor closely for signs of abruption  *FWB: Reactive, Cat I FHT currently.    - CEFM.  *Pain: Acetaminophen for pain  *Global:    -Regular diet    Yohannes Barnes MD, MS,  2022, 9:05 PM

## 2022-12-28 NOTE — PROGRESS NOTES
1730: Report received from Leticia CASILLAS.    1738: This RN to bedside. Assessment completed. Patient denies feeling contractions/LOF/VB, states normal FM. Denies pain.    1900: Report to Jessie CASILLAS. POC discussed. Care relinquished.

## 2022-12-28 NOTE — PROGRESS NOTES
0700 Report received from Jessie CASILLAS    1230 Discharge instructions complete, patient has no further questions at this time

## 2022-12-28 NOTE — PROGRESS NOTES
1420 Report received from Sari Mock RN, POC discussed, assumed pt care. 32 y.o.  EDC 23 EGA 31.0 here to LDA2 with FOB c/o falling at 1300 landing on her knees and then abdomen. Report to Dr. Barnes, OB limited US ordered and performed. Order to observe pt overnight. Transferred to antepartum bed room 230. Report to Leticia Miranda RN assuming care.

## 2022-12-28 NOTE — PROGRESS NOTES
1700- report received from April, RN. Pt transferred to antepartum. Efm continued. Pt oriented to room and call light.    1730- Report given to SONJA Sanz.

## 2023-02-21 ENCOUNTER — HOSPITAL ENCOUNTER (EMERGENCY)
Facility: MEDICAL CENTER | Age: 33
End: 2023-02-21
Attending: OBSTETRICS & GYNECOLOGY | Admitting: OBSTETRICS & GYNECOLOGY
Payer: COMMERCIAL

## 2023-02-21 ENCOUNTER — APPOINTMENT (OUTPATIENT)
Dept: RADIOLOGY | Facility: MEDICAL CENTER | Age: 33
End: 2023-02-21
Attending: OBSTETRICS & GYNECOLOGY
Payer: COMMERCIAL

## 2023-02-21 VITALS
BODY MASS INDEX: 32.02 KG/M2 | HEART RATE: 94 BPM | SYSTOLIC BLOOD PRESSURE: 122 MMHG | DIASTOLIC BLOOD PRESSURE: 69 MMHG | WEIGHT: 174 LBS | HEIGHT: 62 IN | RESPIRATION RATE: 16 BRPM | OXYGEN SATURATION: 96 %

## 2023-02-21 LAB
ADULT RBCS COUNTED 8505ARB2: 4950 ADULT RBC
APTT PPP: 25.9 SEC (ref 24.7–36)
BASOPHILS # BLD AUTO: 0.3 % (ref 0–1.8)
BASOPHILS # BLD: 0.03 K/UL (ref 0–0.12)
EOSINOPHIL # BLD AUTO: 0.08 K/UL (ref 0–0.51)
EOSINOPHIL NFR BLD: 0.9 % (ref 0–6.9)
ERYTHROCYTE [DISTWIDTH] IN BLOOD BY AUTOMATED COUNT: 41.2 FL (ref 35.9–50)
FETAL RBC PERCENT 8505FRBP: 0 %
FETAL STAIN FRBC 8505FRBC: 0 FETAL RBC
FIBRINOGEN PPP-MCNC: 385 MG/DL (ref 215–460)
HCT VFR BLD AUTO: 35.1 % (ref 37–47)
HGB BLD-MCNC: 11.4 G/DL (ref 12–16)
IMM GRANULOCYTES # BLD AUTO: 0.09 K/UL (ref 0–0.11)
IMM GRANULOCYTES NFR BLD AUTO: 1 % (ref 0–0.9)
INR PPP: 0.93 (ref 0.87–1.13)
LYMPHOCYTES # BLD AUTO: 1.81 K/UL (ref 1–4.8)
LYMPHOCYTES NFR BLD: 19.8 % (ref 22–41)
MCH RBC QN AUTO: 27.9 PG (ref 27–33)
MCHC RBC AUTO-ENTMCNC: 32.5 G/DL (ref 33.6–35)
MCV RBC AUTO: 85.8 FL (ref 81.4–97.8)
MONOCYTES # BLD AUTO: 0.56 K/UL (ref 0–0.85)
MONOCYTES NFR BLD AUTO: 6.1 % (ref 0–13.4)
NEUTROPHILS # BLD AUTO: 6.56 K/UL (ref 2–7.15)
NEUTROPHILS NFR BLD: 71.9 % (ref 44–72)
NRBC # BLD AUTO: 0 K/UL
NRBC BLD-RTO: 0 /100 WBC
NUMBER OF RH DOSES IND 8505RD: NORMAL
NUMBER OF RH DOSES IND 8505RD: NORMAL # RHIG
PLATELET # BLD AUTO: 173 K/UL (ref 164–446)
PMV BLD AUTO: 13 FL (ref 9–12.9)
PROTHROMBIN TIME: 12.4 SEC (ref 12–14.6)
RBC # BLD AUTO: 4.09 M/UL (ref 4.2–5.4)
WBC # BLD AUTO: 9.1 K/UL (ref 4.8–10.8)
WEAK D AG RBC QL: NORMAL

## 2023-02-21 PROCEDURE — 85460 HEMOGLOBIN FETAL: CPT

## 2023-02-21 PROCEDURE — 85610 PROTHROMBIN TIME: CPT

## 2023-02-21 PROCEDURE — 85730 THROMBOPLASTIN TIME PARTIAL: CPT

## 2023-02-21 PROCEDURE — 85025 COMPLETE CBC W/AUTO DIFF WBC: CPT

## 2023-02-21 PROCEDURE — 76815 OB US LIMITED FETUS(S): CPT

## 2023-02-21 PROCEDURE — 59025 FETAL NON-STRESS TEST: CPT

## 2023-02-21 PROCEDURE — 86901 BLOOD TYPING SEROLOGIC RH(D): CPT

## 2023-02-21 PROCEDURE — 85384 FIBRINOGEN ACTIVITY: CPT

## 2023-02-21 PROCEDURE — 36415 COLL VENOUS BLD VENIPUNCTURE: CPT

## 2023-02-21 PROCEDURE — 99283 EMERGENCY DEPT VISIT LOW MDM: CPT

## 2023-02-21 ASSESSMENT — PAIN SCALES - GENERAL: PAINLEVEL: 0 - NO PAIN

## 2023-02-21 ASSESSMENT — FIBROSIS 4 INDEX: FIB4 SCORE: 0.8

## 2023-02-22 NOTE — ED PROVIDER NOTES
"OB ED Note    CC: Tripped while walking up the stairs    HPI: Magaly Baker is a 32-year-old G1, P0 who presents at 39 weeks 0 days with complaints of having tripped while trying to walk up the stairs.  She fell down on her knees but then felt like her knees hit her abdomen.  She noticed some worsening of the Jesus Ramos she had been experiencing prior to this.  She denies any bleeding or leaking fluid.  She reports good fetal movement.    ROS: All other systems were reviewed and were found to be negative    /69   Pulse 94   Resp 16   Ht 1.575 m (5' 2\")   Wt 78.9 kg (174 lb)   SpO2 96%   BMI 31.83 kg/m²     General: No apparent distress, sitting up comfortably working on her laptop  Abdomen: Gravid, nontender to palpation  Extremities: No edema    FHT: Baseline of 120s, moderate variability present, accelerations present, decelerations absent  Tocometer: 1 contraction approximately every hour  SVE: 3/50/-2 (unchanged from her prior in office exam)    Recent Labs     02/21/23  1848   WBC 9.1   RBC 4.09*   HEMOGLOBIN 11.4*   HEMATOCRIT 35.1*   MCV 85.8   MCH 27.9   RDW 41.2   PLATELETCT 173   MPV 13.0*   NEUTSPOLYS 71.90   LYMPHOCYTES 19.80*   MONOCYTES 6.10   EOSINOPHILS 0.90   BASOPHILS 0.30     PT 12.4  Fibrinogen:  KB negative    Transabdominal ultrasound: Vertex IUP with out any evidence of placental abruption    Assessment:  Term intrauterine pregnancy at 39 weeks 0 days  Abdominal trauma    Plan:  No evidence of abruption.  No evidence of labor.  Reassuring fetal status after monitoring for 4 hours.  Stable for discharge home and follow-up in clinic with Dr. Healy as previously scheduled.    Ry Ascencio M.D.        "

## 2023-02-22 NOTE — PROGRESS NOTES
Pt presents to L&D after tripping and falling. Pt ambulated to LDA 2 for assessment.     1808 TOCO and EFM applied, VSS. Pt reports +FM, denies LOF or VB. Pt states she was heading to an appointment when she was walking up the stairs, lost her balance, tripped and went down to her knees. Her knees then pressed into her abdomen. Pt denies any contractions but states she has had some cramping over the last week. Pts abdomen is soft and non-tender. All questions answered. RN will update Dr. Ascencio on pt status.     1835 Dr. Ascencio updated on pt status, orders received. Plan to watch pt for 4 hours.     1855 RN at bedside, POC discussed. All questions answered.     1900 Report given to Radha CASILLAS, POC discussed.

## 2023-02-22 NOTE — PROGRESS NOTES
1900-Report received from iNckie CASILLAS. POC discussed.  1910-US tech at bedside  2045-MD Ascencio at bedside, US and labs reviewed. POC to monitor til 2210 and check SVE close to discharge to r/o labor  2152-SVE 3/50/-2. Pt reports being 3 cm at last office visit  2207-Phoned MD Ascencio, updated on pt status and SVE, discharge orders received  2215-Bedside discharge instructions, labor precautions and kick count instructions provided. Pt verbalized understanding. Discharged home ambulatory and undelivered accompanied by significant other

## 2023-02-26 ENCOUNTER — ANESTHESIA EVENT (OUTPATIENT)
Dept: ANESTHESIOLOGY | Facility: MEDICAL CENTER | Age: 33
End: 2023-02-26
Payer: COMMERCIAL

## 2023-02-26 ENCOUNTER — ANESTHESIA (OUTPATIENT)
Dept: ANESTHESIOLOGY | Facility: MEDICAL CENTER | Age: 33
End: 2023-02-26
Payer: COMMERCIAL

## 2023-02-26 ENCOUNTER — HOSPITAL ENCOUNTER (INPATIENT)
Facility: MEDICAL CENTER | Age: 33
LOS: 2 days | End: 2023-02-28
Attending: OBSTETRICS & GYNECOLOGY | Admitting: OBSTETRICS & GYNECOLOGY
Payer: COMMERCIAL

## 2023-02-26 LAB
BASOPHILS # BLD AUTO: 0.4 % (ref 0–1.8)
BASOPHILS # BLD: 0.03 K/UL (ref 0–0.12)
EOSINOPHIL # BLD AUTO: 0.08 K/UL (ref 0–0.51)
EOSINOPHIL NFR BLD: 1 % (ref 0–6.9)
ERYTHROCYTE [DISTWIDTH] IN BLOOD BY AUTOMATED COUNT: 43.1 FL (ref 35.9–50)
HCT VFR BLD AUTO: 35.6 % (ref 37–47)
HGB BLD-MCNC: 11.6 G/DL (ref 12–16)
HOLDING TUBE BB 8507: NORMAL
IMM GRANULOCYTES # BLD AUTO: 0.09 K/UL (ref 0–0.11)
IMM GRANULOCYTES NFR BLD AUTO: 1.1 % (ref 0–0.9)
LYMPHOCYTES # BLD AUTO: 1.76 K/UL (ref 1–4.8)
LYMPHOCYTES NFR BLD: 21.2 % (ref 22–41)
MCH RBC QN AUTO: 28.4 PG (ref 27–33)
MCHC RBC AUTO-ENTMCNC: 32.6 G/DL (ref 33.6–35)
MCV RBC AUTO: 87 FL (ref 81.4–97.8)
MONOCYTES # BLD AUTO: 0.64 K/UL (ref 0–0.85)
MONOCYTES NFR BLD AUTO: 7.7 % (ref 0–13.4)
NEUTROPHILS # BLD AUTO: 5.7 K/UL (ref 2–7.15)
NEUTROPHILS NFR BLD: 68.6 % (ref 44–72)
NRBC # BLD AUTO: 0 K/UL
NRBC BLD-RTO: 0 /100 WBC
PLATELET # BLD AUTO: 167 K/UL (ref 164–446)
PMV BLD AUTO: 13.1 FL (ref 9–12.9)
RBC # BLD AUTO: 4.09 M/UL (ref 4.2–5.4)
T PALLIDUM AB SER QL IA: NORMAL
WBC # BLD AUTO: 8.3 K/UL (ref 4.8–10.8)

## 2023-02-26 PROCEDURE — 01967 NEURAXL LBR ANES VAG DLVR: CPT | Performed by: INTERNAL MEDICINE

## 2023-02-26 PROCEDURE — 4A1HXCZ MONITORING OF PRODUCTS OF CONCEPTION, CARDIAC RATE, EXTERNAL APPROACH: ICD-10-PCS | Performed by: OBSTETRICS & GYNECOLOGY

## 2023-02-26 PROCEDURE — 36415 COLL VENOUS BLD VENIPUNCTURE: CPT

## 2023-02-26 PROCEDURE — 85025 COMPLETE CBC W/AUTO DIFF WBC: CPT

## 2023-02-26 PROCEDURE — 770002 HCHG ROOM/CARE - OB PRIVATE (112)

## 2023-02-26 PROCEDURE — 0KQM0ZZ REPAIR PERINEUM MUSCLE, OPEN APPROACH: ICD-10-PCS | Performed by: OBSTETRICS & GYNECOLOGY

## 2023-02-26 PROCEDURE — 700105 HCHG RX REV CODE 258: Performed by: INTERNAL MEDICINE

## 2023-02-26 PROCEDURE — 700111 HCHG RX REV CODE 636 W/ 250 OVERRIDE (IP): Performed by: OBSTETRICS & GYNECOLOGY

## 2023-02-26 PROCEDURE — 3E033VJ INTRODUCTION OF OTHER HORMONE INTO PERIPHERAL VEIN, PERCUTANEOUS APPROACH: ICD-10-PCS | Performed by: OBSTETRICS & GYNECOLOGY

## 2023-02-26 PROCEDURE — 303615 HCHG EPIDURAL/SPINAL ANESTHESIA FOR LABOR

## 2023-02-26 PROCEDURE — 700105 HCHG RX REV CODE 258: Performed by: OBSTETRICS & GYNECOLOGY

## 2023-02-26 PROCEDURE — 700101 HCHG RX REV CODE 250: Performed by: INTERNAL MEDICINE

## 2023-02-26 PROCEDURE — 304965 HCHG RECOVERY SERVICES

## 2023-02-26 PROCEDURE — 86780 TREPONEMA PALLIDUM: CPT

## 2023-02-26 PROCEDURE — 700111 HCHG RX REV CODE 636 W/ 250 OVERRIDE (IP): Performed by: INTERNAL MEDICINE

## 2023-02-26 PROCEDURE — 10907ZC DRAINAGE OF AMNIOTIC FLUID, THERAPEUTIC FROM PRODUCTS OF CONCEPTION, VIA NATURAL OR ARTIFICIAL OPENING: ICD-10-PCS | Performed by: OBSTETRICS & GYNECOLOGY

## 2023-02-26 PROCEDURE — 700102 HCHG RX REV CODE 250 W/ 637 OVERRIDE(OP): Performed by: OBSTETRICS & GYNECOLOGY

## 2023-02-26 PROCEDURE — A9270 NON-COVERED ITEM OR SERVICE: HCPCS | Performed by: OBSTETRICS & GYNECOLOGY

## 2023-02-26 PROCEDURE — 59409 OBSTETRICAL CARE: CPT

## 2023-02-26 RX ORDER — TERBUTALINE SULFATE 1 MG/ML
0.25 INJECTION, SOLUTION SUBCUTANEOUS
Status: DISCONTINUED | OUTPATIENT
Start: 2023-02-26 | End: 2023-02-26 | Stop reason: HOSPADM

## 2023-02-26 RX ORDER — LIDOCAINE HYDROCHLORIDE AND EPINEPHRINE 15; 5 MG/ML; UG/ML
INJECTION, SOLUTION EPIDURAL
Status: COMPLETED | OUTPATIENT
Start: 2023-02-26 | End: 2023-02-26

## 2023-02-26 RX ORDER — ACETAMINOPHEN 500 MG
1000 TABLET ORAL
Status: DISCONTINUED | OUTPATIENT
Start: 2023-02-26 | End: 2023-02-26 | Stop reason: HOSPADM

## 2023-02-26 RX ORDER — SODIUM CHLORIDE, SODIUM LACTATE, POTASSIUM CHLORIDE, CALCIUM CHLORIDE 600; 310; 30; 20 MG/100ML; MG/100ML; MG/100ML; MG/100ML
INJECTION, SOLUTION INTRAVENOUS PRN
Status: DISCONTINUED | OUTPATIENT
Start: 2023-02-26 | End: 2023-02-28 | Stop reason: HOSPADM

## 2023-02-26 RX ORDER — FAMOTIDINE 20 MG/1
20 TABLET, FILM COATED ORAL 2 TIMES DAILY
Status: ON HOLD | COMMUNITY
End: 2023-02-28

## 2023-02-26 RX ORDER — SODIUM CHLORIDE, SODIUM LACTATE, POTASSIUM CHLORIDE, AND CALCIUM CHLORIDE .6; .31; .03; .02 G/100ML; G/100ML; G/100ML; G/100ML
250 INJECTION, SOLUTION INTRAVENOUS PRN
Status: DISCONTINUED | OUTPATIENT
Start: 2023-02-26 | End: 2023-02-26 | Stop reason: HOSPADM

## 2023-02-26 RX ORDER — SODIUM CHLORIDE, SODIUM LACTATE, POTASSIUM CHLORIDE, AND CALCIUM CHLORIDE .6; .31; .03; .02 G/100ML; G/100ML; G/100ML; G/100ML
1000 INJECTION, SOLUTION INTRAVENOUS
Status: COMPLETED | OUTPATIENT
Start: 2023-02-26 | End: 2023-02-26

## 2023-02-26 RX ORDER — OXYTOCIN 10 [USP'U]/ML
10 INJECTION, SOLUTION INTRAMUSCULAR; INTRAVENOUS
Status: DISCONTINUED | OUTPATIENT
Start: 2023-02-26 | End: 2023-02-26 | Stop reason: HOSPADM

## 2023-02-26 RX ORDER — ONDANSETRON 2 MG/ML
4 INJECTION INTRAMUSCULAR; INTRAVENOUS EVERY 6 HOURS PRN
Status: DISCONTINUED | OUTPATIENT
Start: 2023-02-26 | End: 2023-02-26 | Stop reason: HOSPADM

## 2023-02-26 RX ORDER — ACETAMINOPHEN 500 MG
1000 TABLET ORAL EVERY 6 HOURS PRN
Status: DISCONTINUED | OUTPATIENT
Start: 2023-02-26 | End: 2023-02-28 | Stop reason: HOSPADM

## 2023-02-26 RX ORDER — MISOPROSTOL 200 UG/1
600 TABLET ORAL
Status: DISCONTINUED | OUTPATIENT
Start: 2023-02-26 | End: 2023-02-28 | Stop reason: HOSPADM

## 2023-02-26 RX ORDER — BUPIVACAINE HYDROCHLORIDE 2.5 MG/ML
INJECTION, SOLUTION EPIDURAL; INFILTRATION; INTRACAUDAL
Status: COMPLETED | OUTPATIENT
Start: 2023-02-26 | End: 2023-02-26

## 2023-02-26 RX ORDER — ONDANSETRON 4 MG/1
4 TABLET, ORALLY DISINTEGRATING ORAL EVERY 6 HOURS PRN
Status: DISCONTINUED | OUTPATIENT
Start: 2023-02-26 | End: 2023-02-26 | Stop reason: HOSPADM

## 2023-02-26 RX ORDER — SODIUM CHLORIDE, SODIUM LACTATE, POTASSIUM CHLORIDE, CALCIUM CHLORIDE 600; 310; 30; 20 MG/100ML; MG/100ML; MG/100ML; MG/100ML
INJECTION, SOLUTION INTRAVENOUS CONTINUOUS
Status: DISCONTINUED | OUTPATIENT
Start: 2023-02-26 | End: 2023-02-28 | Stop reason: HOSPADM

## 2023-02-26 RX ORDER — IBUPROFEN 800 MG/1
800 TABLET ORAL
Status: DISCONTINUED | OUTPATIENT
Start: 2023-02-26 | End: 2023-02-26 | Stop reason: HOSPADM

## 2023-02-26 RX ORDER — EPHEDRINE SULFATE 50 MG/ML
5 INJECTION, SOLUTION INTRAVENOUS
Status: DISCONTINUED | OUTPATIENT
Start: 2023-02-26 | End: 2023-02-26 | Stop reason: HOSPADM

## 2023-02-26 RX ORDER — ROPIVACAINE HYDROCHLORIDE 2 MG/ML
INJECTION, SOLUTION EPIDURAL; INFILTRATION; PERINEURAL CONTINUOUS
Status: DISCONTINUED | OUTPATIENT
Start: 2023-02-26 | End: 2023-02-28 | Stop reason: HOSPADM

## 2023-02-26 RX ORDER — METHYLERGONOVINE MALEATE 0.2 MG/ML
0.2 INJECTION INTRAVENOUS
Status: DISCONTINUED | OUTPATIENT
Start: 2023-02-26 | End: 2023-02-28 | Stop reason: HOSPADM

## 2023-02-26 RX ORDER — DOCUSATE SODIUM 100 MG/1
100 CAPSULE, LIQUID FILLED ORAL 2 TIMES DAILY PRN
Status: DISCONTINUED | OUTPATIENT
Start: 2023-02-26 | End: 2023-02-28 | Stop reason: HOSPADM

## 2023-02-26 RX ORDER — IBUPROFEN 800 MG/1
800 TABLET ORAL EVERY 8 HOURS PRN
Status: DISCONTINUED | OUTPATIENT
Start: 2023-02-26 | End: 2023-02-28 | Stop reason: HOSPADM

## 2023-02-26 RX ORDER — LIDOCAINE HYDROCHLORIDE 10 MG/ML
20 INJECTION, SOLUTION INFILTRATION; PERINEURAL
Status: DISCONTINUED | OUTPATIENT
Start: 2023-02-26 | End: 2023-02-26 | Stop reason: HOSPADM

## 2023-02-26 RX ADMIN — SODIUM CHLORIDE, POTASSIUM CHLORIDE, SODIUM LACTATE AND CALCIUM CHLORIDE 1000 ML: 600; 310; 30; 20 INJECTION, SOLUTION INTRAVENOUS at 13:33

## 2023-02-26 RX ADMIN — ROPIVACAINE HYDROCHLORIDE: 2 INJECTION, SOLUTION EPIDURAL; INFILTRATION at 14:05

## 2023-02-26 RX ADMIN — IBUPROFEN 800 MG: 800 TABLET, FILM COATED ORAL at 21:17

## 2023-02-26 RX ADMIN — OXYTOCIN 20 UNITS: 10 INJECTION, SOLUTION INTRAMUSCULAR; INTRAVENOUS at 18:25

## 2023-02-26 RX ADMIN — FENTANYL CITRATE 100 MCG: 50 INJECTION, SOLUTION INTRAMUSCULAR; INTRAVENOUS at 14:00

## 2023-02-26 RX ADMIN — BUPIVACAINE HYDROCHLORIDE 4 ML: 2.5 INJECTION, SOLUTION EPIDURAL; INFILTRATION; INTRACAUDAL; PERINEURAL at 14:00

## 2023-02-26 RX ADMIN — DOCUSATE SODIUM 100 MG: 100 CAPSULE, LIQUID FILLED ORAL at 21:17

## 2023-02-26 RX ADMIN — SODIUM CHLORIDE, POTASSIUM CHLORIDE, SODIUM LACTATE AND CALCIUM CHLORIDE: 600; 310; 30; 20 INJECTION, SOLUTION INTRAVENOUS at 07:45

## 2023-02-26 RX ADMIN — OXYTOCIN 2 MILLI-UNITS/MIN: 10 INJECTION, SOLUTION INTRAMUSCULAR; INTRAVENOUS at 07:46

## 2023-02-26 RX ADMIN — LIDOCAINE HYDROCHLORIDE,EPINEPHRINE BITARTRATE 5 ML: 15; .005 INJECTION, SOLUTION EPIDURAL; INFILTRATION; INTRACAUDAL; PERINEURAL at 14:00

## 2023-02-26 ASSESSMENT — PATIENT HEALTH QUESTIONNAIRE - PHQ9
1. LITTLE INTEREST OR PLEASURE IN DOING THINGS: NOT AT ALL
SUM OF ALL RESPONSES TO PHQ9 QUESTIONS 1 AND 2: 0
2. FEELING DOWN, DEPRESSED, IRRITABLE, OR HOPELESS: NOT AT ALL

## 2023-02-26 ASSESSMENT — LIFESTYLE VARIABLES: EVER_SMOKED: NEVER

## 2023-02-26 ASSESSMENT — FIBROSIS 4 INDEX: FIB4 SCORE: 0.8

## 2023-02-26 ASSESSMENT — PAIN DESCRIPTION - PAIN TYPE
TYPE: ACUTE PAIN
TYPE: ACUTE PAIN

## 2023-02-26 NOTE — ANESTHESIA PROCEDURE NOTES
Epidural Block    Date/Time: 2/26/2023 2:00 PM  Performed by: Oj Lomax M.D.  Authorized by: Oj Lomax M.D.     Patient Location:  OB  Start Time:  2/26/2023 2:00 PM  End Time:  2/26/2023 2:01 PM  Reason for Block: labor analgesia    patient identified, IV checked, site marked, risks and benefits discussed, surgical consent, monitors and equipment checked, pre-op evaluation and timeout performed    Patient Position:  Sitting  Prep: ChloraPrep, patient draped and sterile technique    Monitoring:  Blood pressure, continuous pulse oximetry and heart rate  Approach:  Midline  Location:  L3-L4  Injection Technique:  TIFFANY saline and TIFFANY air  Skin infiltration:  Lidocaine  Strength:  1%  Dose:  3ml  Needle Type:  Tuohy  Needle Gauge:  17 G  Needle Length:  3.5 in  Loss of resistance::  5  Catheter Size:  19 G  Catheter at Skin Depth:  10  Test Dose Result:  Negative   Single pass, easy TIFFANY with negative aspiration. Negative test dose and aspiration via catheter. Patient comfortable after bolus, no complications.

## 2023-02-26 NOTE — H&P
DATE OF ADMISSION:  2023     IDENTIFICATION:  A 32-year-old  at 39 weeks and 5 days.  She is admitted   for elective induction of labor.     HISTORY OF PRESENT ILLNESS:  This is a patient of mine.  Pregnancy has been   uncomplicated.  She did have an NIPT that was low risk.  Carrier testing was   positive for 2 genes. 's testing was negative.  Her 1-hour glucose test   was 134.  She had a normal anatomy scan with an anterior placenta.  Recent   growth scan, baby is measuring on the 30th percentile with normal TAYLOR.  Her   GBS is negative.  She presented this morning for induction of labor.  She was   3 cm dilated.  She is on Pitocin and is feeling some contractions.  She is   planning on an epidural for pain control.     PAST MEDICAL HISTORY:  IBS.     PAST SURGICAL HISTORY:  None.     SOCIAL HISTORY:  She is  to her .  She denies use of tobacco,   alcohol, or drugs.     PHYSICAL EXAMINATION:    VITAL SIGNS:  Her blood pressures are normotensive.  She is afebrile.  GENERAL:  She is pleasant, cooperative and appears her stated age.  LUNGS:  Her breathing is unlabored.  ABDOMEN:  Soft, gravid, Leopold's 7 pounds.  PELVIC:  Sterile vaginal exam 3-4, 80, and -2.  Fetal heart tracings currently   category 1.  Tocometry reveals contractions irregularly.     ASSESSMENT AND PLAN:  A 32-year-old  at 39 weeks and 5 days, here for   elective induction of labor.  She has a favorable cervix, so she is already on   Pitocin.  She is planning on an epidural for pain control.  Once that was in   place, we will proceed with artificial rupture of membranes and follow her   along in labor.  We expect a vaginal delivery.        ______________________________  MD TYRONE Barajas/GIANNA    DD:  2023 11:56  DT:  2023 12:04    Job#:  [id number]

## 2023-02-26 NOTE — ANESTHESIA PREPROCEDURE EVALUATION
Date: 02/26/23  Procedure: Labor Epidural     Patient requests Neuraxial Labor Analgesia.     Anesthesia: No problems with Anesthesia.  Resp: No asthma or COPD history.  Cards: No pre-eclampsia, or known cardiac abnormalities.  Heme: No known bleeding disorders, platelets reviewed.     Risks of procedure discussed including: infection, bleeding, nerve damage, and post-dural puncture headache.     Relevant Problems   Other   (positive) Indication for care in labor or delivery       Physical Exam    Airway   Mallampati: II  TM distance: >3 FB  Neck ROM: full       Cardiovascular - normal exam  Rhythm: regular  Rate: normal  (-) murmur     Dental - normal exam           Pulmonary - normal exam  Breath sounds clear to auscultation     Abdominal    Neurological - normal exam                 Anesthesia Plan    ASA 2       Plan - epidural   Neuraxial block will be labor analgesia                  Pertinent diagnostic labs and testing reviewed    Informed Consent:    Anesthetic plan and risks discussed with patient.

## 2023-02-27 LAB
ERYTHROCYTE [DISTWIDTH] IN BLOOD BY AUTOMATED COUNT: 43.6 FL (ref 35.9–50)
HCT VFR BLD AUTO: 33.5 % (ref 37–47)
HGB BLD-MCNC: 10.6 G/DL (ref 12–16)
MCH RBC QN AUTO: 27.1 PG (ref 27–33)
MCHC RBC AUTO-ENTMCNC: 31.6 G/DL (ref 33.6–35)
MCV RBC AUTO: 85.7 FL (ref 81.4–97.8)
PLATELET # BLD AUTO: 134 K/UL (ref 164–446)
PMV BLD AUTO: 13.3 FL (ref 9–12.9)
RBC # BLD AUTO: 3.91 M/UL (ref 4.2–5.4)
WBC # BLD AUTO: 12.1 K/UL (ref 4.8–10.8)

## 2023-02-27 PROCEDURE — 770002 HCHG ROOM/CARE - OB PRIVATE (112)

## 2023-02-27 PROCEDURE — 700102 HCHG RX REV CODE 250 W/ 637 OVERRIDE(OP): Performed by: OBSTETRICS & GYNECOLOGY

## 2023-02-27 PROCEDURE — 36415 COLL VENOUS BLD VENIPUNCTURE: CPT

## 2023-02-27 PROCEDURE — 85027 COMPLETE CBC AUTOMATED: CPT

## 2023-02-27 PROCEDURE — A9270 NON-COVERED ITEM OR SERVICE: HCPCS | Performed by: OBSTETRICS & GYNECOLOGY

## 2023-02-27 RX ADMIN — ACETAMINOPHEN 1000 MG: 500 TABLET, FILM COATED ORAL at 22:26

## 2023-02-27 RX ADMIN — IBUPROFEN 800 MG: 800 TABLET, FILM COATED ORAL at 06:01

## 2023-02-27 RX ADMIN — DOCUSATE SODIUM 100 MG: 100 CAPSULE, LIQUID FILLED ORAL at 19:43

## 2023-02-27 RX ADMIN — IBUPROFEN 800 MG: 800 TABLET, FILM COATED ORAL at 19:43

## 2023-02-27 ASSESSMENT — PAIN DESCRIPTION - PAIN TYPE
TYPE: ACUTE PAIN

## 2023-02-27 ASSESSMENT — EDINBURGH POSTNATAL DEPRESSION SCALE (EPDS)
I HAVE LOOKED FORWARD WITH ENJOYMENT TO THINGS: AS MUCH AS I EVER DID
I HAVE BEEN ABLE TO LAUGH AND SEE THE FUNNY SIDE OF THINGS: AS MUCH AS I ALWAYS COULD
THE THOUGHT OF HARMING MYSELF HAS OCCURRED TO ME: NEVER
I HAVE BEEN SO UNHAPPY THAT I HAVE BEEN CRYING: NO, NEVER
I HAVE FELT SCARED OR PANICKY FOR NO GOOD REASON: NO, NOT AT ALL
I HAVE FELT SAD OR MISERABLE: NO, NOT AT ALL
I HAVE BEEN ANXIOUS OR WORRIED FOR NO GOOD REASON: HARDLY EVER
I HAVE BLAMED MYSELF UNNECESSARILY WHEN THINGS WENT WRONG: NO, NEVER
I HAVE BEEN SO UNHAPPY THAT I HAVE HAD DIFFICULTY SLEEPING: NOT AT ALL
THINGS HAVE BEEN GETTING ON TOP OF ME: NO, I HAVE BEEN COPING AS WELL AS EVER

## 2023-02-27 NOTE — ANESTHESIA TIME REPORT
Anesthesia Start and Stop Event Times     Date Time Event    2/26/2023 1355 Ready for Procedure     1355 Anesthesia Start     1820 Anesthesia Stop        Responsible Staff  02/26/23    Name Role Begin End    Oj Lomax M.D. Anesth 1355 1820        Overtime Reason:  no overtime (within assigned shift)    Comments:

## 2023-02-27 NOTE — PROGRESS NOTES
172 - Report from Birdie CASILLAS. FHTS down in 60-70s, resucitation measures including pitocin off, LR bolus, position changes. Thompson called to bedside.      -  viable male infant.      - Report to Génesis CASILLAS.

## 2023-02-27 NOTE — LACTATION NOTE
This note was copied from a baby's chart.  Mom is a 31 y/o P1 who delivered baby boy weighing 6 # 12.8  oz at 39.5 wks.   Mom had started pumping and offering a syringe of EXBM with pacifier. LC reviewed mom's desired feeding plan and if mother would like to BF, LC reccommended that mom attempt every feeding at breast and call for assist. Mom reports that baby doesn't latch and she is not sure if she can breast feed. LC spoke with mom and encouraged mom to keep baby STS during waking hours.  Mom states that she is a having visitors in 30 minutes and doesn't want to put baby STS during visitation.   LC had some time before visitors arrived and assisted baby to left breast in FB hold and taught basic latching/positioning techniques. Baby was unable to sustain latch. Mom hand expressed colostrum on to nipple but bay did not latch. LC applied a nipple shield that mom had brought with her at home. LC demonstrated placement of NS and explained cleaning to FOB. Baby was able tot latch and needed some gentle stimulation to illicit a suckle pattern. Mom hand massaged left breast and baby did had several bursts of sucks and self started. Mom was pleased with progress of baby and will pump her breast after visiting.  LC taught basic breast feeding education and feeding plan.  Breastfeeding plan:  Feed baby with feeding cues and at least a minimum of 8x/24 hours.  Expect cluster feeding as this is normal during early days of life and nighttime.   It is not recommended to let baby sleep longer than 3 hours between feedings and if sleepy, place skin to skin to promote feeding interest and increase milk production and call bedside RN for assist to latch..  Baby's usually feed more frequently and longer when skin to skin with mother. It is not recommended to use pacifiers or supplementing with formula until breast feeding and milk production is well established or at least 3- 4 weeks unless medically indicated.  Mom will pump after  feeds to surge supply. Supplement when indicated. LC reccommended hand expression and feeding with a spoon as needed.    Mom has a pump at home for personal use and referral was sent to [facility] for support after discharge. Recommended follow up in the morning.

## 2023-02-27 NOTE — PROGRESS NOTES
Obstetrics Postpartum Progress Note    Events  No acute events     Subjective:  Doing well. Reports pain is controlled. Lochia minimal. Ambulating. Voiding without difficulty. + Flatus. No bowel movement. Denies headaches or changes in vision. Breastfeeding.     PHYSICAL EXAM:  Vitals:   Vitals:    23 2103 23 0148 23 0600   BP: 121/64 121/74 118/76 119/85   Pulse: 80 82 79 85   Resp:  17 16 18   Temp:  36.6 °C (97.9 °F) 36.6 °C (97.8 °F) 36.3 °C (97.4 °F)   TempSrc:  Temporal Temporal Temporal   SpO2:  98% 96% 97%   Weight:       Height:          General: Alert, conversational, pleasant, no acute distress.  CVS: Regular rate.  PULM: No respiratory distress, symmetric expansion.  ABD: Soft, non-tender, non-distended, fundus firm, non-tender, below the umbilicus. Incision dressing clean and dry. No surrounding erythema or drainage.   : Deferred  Extremities: Moves all, trace edema.     Labs Reviewed and Significant for:  Recent Labs     23  0700   WBC 8.3   RBC 4.09*   HEMOGLOBIN 11.6*   HEMATOCRIT 35.6*   MCV 87.0   MCH 28.4   RDW 43.1   PLATELETCT 167   MPV 13.1*   NEUTSPOLYS 68.60   LYMPHOCYTES 21.20*   MONOCYTES 7.70   EOSINOPHILS 1.00   BASOPHILS 0.40        Assessment and Plan:    Magaly Baker is a 32 y.o.  postpartum day 1 s/p Vaginal, Spontaneous  at 39w5d,  cc    ASSESSMENT:  Postpartum state  Delivered late last evening  Gbs negative    PLAN:  - Continue routine postpartum care.   - Anticipate d/c tomorrow    Radha Healy M.D.

## 2023-02-27 NOTE — ANESTHESIA POSTPROCEDURE EVALUATION
Patient: Magaly Baker    Procedure Summary     Date: 02/26/23 Room / Location:     Anesthesia Start: 1355 Anesthesia Stop: 1820    Procedure: Labor Epidural Diagnosis:     Scheduled Providers:  Responsible Provider: Oj Lomax M.D.    Anesthesia Type: epidural ASA Status: 2          Final Anesthesia Type: epidural  Last vitals  BP   Blood Pressure: 118/73    Temp   36.7 °C (98 °F)    Pulse   88   Resp   18    SpO2     97%     Anesthesia Post Evaluation    Patient location during evaluation: PACU  Patient participation: complete - patient participated  Level of consciousness: awake and alert    Airway patency: patent  Anesthetic complications: no  Cardiovascular status: hemodynamically stable  Respiratory status: acceptable  Hydration status: euvolemic  Comments: Patient tolerated epidural labor analgesia well. Epidural will be removed per protocol. RN to call with any questions or concerns.       PONV: none          No notable events documented.     Nurse Pain Score: 0 (NPRS)

## 2023-02-27 NOTE — L&D DELIVERY NOTE
DATE OF SERVICE:  2023     This is a 32-year-old  1, para 0 female at 39 weeks and 5 days.  She is   a patient of mine.  Pregnancy has been overall uncomplicated.  She had a   low-risk NIPT and a normal anatomy scan with an anterior placenta.  GBS is   negative.  Glucola was normal.  She was admitted this morning for elective   induction of labor.  On Leopold, felt to be about 7 pounds.  She was 3-4 cm   dilated and was started on Pitocin.  Once she was juan regularly, she   received an epidural for pain control and then underwent artificial rupture of   membranes.  She initially had a category 1 fetal heart tracing. After her   water broke, her labor really kicked in and she did have a category 2 tracing   for a period of time that initially responded to an amnioinfusion.  She did   progress quickly to complete and with fetal descent and rapid dilation, she   had an episode of prolonged deceleration that responded ultimately after   position changes.  She went on to push for approximately 40 minutes and   delivered a male infant from OA presentation.  Delivery of the head was manual   assisted, shoulders and the rest of body then followed without difficulty.    Baby was handed immediately to nursing staff.  Cord was doubly clamped and cut   at 30 seconds.  Segment of cord gases was handed off and placenta then   delivered spontaneously and intact with some gentle traction.  Uterus firmed   up nicely with IV Pitocin.  Inspection of the vagina and perineum revealed a   second-degree midline laceration that was repaired in the usual fashion using   3-0 Vicryl.   mL, Apgars were 7 and 9, weight was 6 pounds and 12   ounces.  Mom and baby are doing well.  Sponge and needle counts were correct.        ______________________________  MD TYRONE Barajas/MADHU/TANO    DD:  2023 18:47  DT:  2023 21:07    Job#:  [id number]

## 2023-02-27 NOTE — CARE PLAN
Problem: Knowledge Deficit - L&D  Goal: Patient and family/caregivers will demonstrate understanding of plan of care, disease process/condition, diagnostic tests and medications  Outcome: Met     Problem: Risk for Excess Fluid Volume  Goal: Patient will demonstrate pulse, blood pressure and neurologic signs within expected ranges and without any respiratory complications  Outcome: Met     Problem: Pain - Standard  Goal: Alleviation of pain or a reduction in pain to the patient’s comfort goal  Outcome: Met     Problem: Knowledge Deficit - Standard  Goal: Patient and family/care givers will demonstrate understanding of plan of care, disease process/condition, diagnostic tests and medications  Outcome: Met   The patient is Stable - Low risk of patient condition declining or worsening            foot

## 2023-02-27 NOTE — PROGRESS NOTES
Bedside report received. Assumed care of patient this morning. Assessment completed  Patient reports tolerable pain at this time. PRN's available, pt is aware.    and spouse at bedside.   PIV d/c    Plan of care reviewed with the patient. Bed is locked and in the lowest position. Call light is within reach. Patient encouraged to voice needs and concerns, all needs met at this time. Hourly rounding in place.

## 2023-02-27 NOTE — CARE PLAN
The patient is Stable - Low risk of patient condition declining or worsening    Shift Goals  Clinical Goals: VSS, pain control, fundus firm with light lochia    Progress made toward(s) clinical / shift goals:  VSS, fundus firm with light lochia, voiding well, encouraged to call for assistance as needed.     Patient is not progressing towards the following goals:

## 2023-02-27 NOTE — PROGRESS NOTES
Late Entry    0642-TOCO and US on.  VSS.  Pt presents to L&D for her scheduled IOL at 39.5 weeks.  She has no obstetrical complaints.  SVE=3/75/-2.  Dr. Healy notified of pt arrival and most recent exam-orders for Pitocin induction.  0746-Oxytocin started per pump.  1400-Dr. Lomax at BS to place epidural.  1425-Pt c/o numbness in left face shoulder and arm, BP stable, FHTs reassuring, pt is comfortable but feeling nervous about numbness of upper extremity, pt sat in upright position, Dr. Lomax informed.  1430-Dr. Lomax at BS to assess pt.  Pt assured by anesthesia that she should not be concerned.  1545-Dr Lomax requested to come to BS to assess epidural site, both tegaderm and tape had come away from the site.  Dr. Lomax at BS to assess and retape.  1515-SVE=4/80/-1.  1537-AROM, clear, 5/90/-1, IUPC placed. 1700-Variable decels, pt repositioned without resolve, SVE=6/90/0.  1706-Dr Thompson called and requested to review tracing-orders for an amnioinfusion.  1713-amnioinfusion started.  1725-BS report given to Kiarra CASILLAS, at this time FHTs down to the 60s and 70s pt's position changed, SVE=ant lip.  1730-oxytocin off, SVE=complete, Dr. Healy requested to come to BS.  1732-Dr. Healy at BS, FSE applied-not working, new cord, still not working, continue with EFM.  1741-Pt in uszanne lion MD and RNs at BS.  1759-Oxytocin restarted at 1759 per MD request.  1820-Delivery of viable male.

## 2023-02-28 ENCOUNTER — PHARMACY VISIT (OUTPATIENT)
Dept: PHARMACY | Facility: MEDICAL CENTER | Age: 33
End: 2023-02-28
Payer: COMMERCIAL

## 2023-02-28 VITALS
SYSTOLIC BLOOD PRESSURE: 111 MMHG | RESPIRATION RATE: 17 BRPM | HEART RATE: 64 BPM | HEIGHT: 62 IN | WEIGHT: 174 LBS | OXYGEN SATURATION: 96 % | DIASTOLIC BLOOD PRESSURE: 74 MMHG | BODY MASS INDEX: 32.02 KG/M2 | TEMPERATURE: 97.4 F

## 2023-02-28 PROCEDURE — RXMED WILLOW AMBULATORY MEDICATION CHARGE: Performed by: OBSTETRICS & GYNECOLOGY

## 2023-02-28 PROCEDURE — A9270 NON-COVERED ITEM OR SERVICE: HCPCS | Performed by: OBSTETRICS & GYNECOLOGY

## 2023-02-28 PROCEDURE — 700102 HCHG RX REV CODE 250 W/ 637 OVERRIDE(OP): Performed by: OBSTETRICS & GYNECOLOGY

## 2023-02-28 RX ORDER — IBUPROFEN 800 MG/1
800 TABLET ORAL EVERY 8 HOURS PRN
Qty: 30 TABLET | Refills: 0 | Status: SHIPPED | OUTPATIENT
Start: 2023-02-28 | End: 2023-08-30

## 2023-02-28 RX ORDER — PSEUDOEPHEDRINE HCL 30 MG
100 TABLET ORAL 2 TIMES DAILY PRN
Qty: 60 CAPSULE | Refills: 0 | Status: SHIPPED | OUTPATIENT
Start: 2023-02-28 | End: 2023-08-30

## 2023-02-28 RX ADMIN — ACETAMINOPHEN 1000 MG: 500 TABLET, FILM COATED ORAL at 08:32

## 2023-02-28 RX ADMIN — IBUPROFEN 800 MG: 800 TABLET, FILM COATED ORAL at 05:21

## 2023-02-28 ASSESSMENT — PAIN DESCRIPTION - PAIN TYPE
TYPE: ACUTE PAIN

## 2023-02-28 NOTE — PROGRESS NOTES
0800 Assessment completed. Lochia light, fundus firm. Plan of care reviewed. Reports mild pain, see MAR for intervention.  1045 Discharge instructions and education reviewed with patient, verbalized understanding, papers signed.   1105 Left facility escorted by staff.

## 2023-02-28 NOTE — CARE PLAN
The patient is Stable - Low risk of patient condition declining or worsening    Shift Goals  Clinical Goals: pain control, fundal assessments, VSS  Family Goals: support patient, bond with     Progress made toward(s) clinical / shift goals:  patient reports adequate pain control so far this shift. Patient mobilizing in room without difficulty. Patient mobilized several times throughout the shift.   Problem: Knowledge Deficit - Postpartum  Goal: Patient will verbalize and demonstrate understanding of self and infant care  Outcome: Progressing     Problem: Psychosocial - Postpartum  Goal: Patient will verbalize and demonstrate effective bonding and parenting behavior  Outcome: Progressing     Problem: Early Mobilization - Post Surgery  Goal: Early mobilization post surgery  Outcome: Progressing       Patient is not progressing towards the following goals:

## 2023-02-28 NOTE — CARE PLAN
The patient is Stable - Low risk of patient condition declining or worsening    Shift Goals  Clinical Goals: VSS, fundus firm with light lochia  Family Goals: support patient, bond with     Progress made toward(s) clinical / shift goals:  VSS, fundus firm with light lochia, assistance with breastfeeding, patient able to latch baby on breast with nipple shield       Patient is not progressing towards the following goals:

## 2023-02-28 NOTE — CARE PLAN
The patient is Stable - Low risk of patient condition declining or worsening    Shift Goals  Clinical Goals: Patient will maintain stable VS; D/c home today  Family Goals: support patient, bond with     Progress made toward(s) clinical / shift goals:    Problem: Knowledge Deficit - Postpartum  Goal: Patient will verbalize and demonstrate understanding of self and infant care  Outcome: Met     Problem: Psychosocial - Postpartum  Goal: Patient will verbalize and demonstrate effective bonding and parenting behavior  Outcome: Met     Problem: Altered Physiologic Condition  Goal: Patient physiologically stable as evidenced by normal lochia, palpable uterine involution and vitals within normal limits  Outcome: Met     Problem: Infection - Postpartum  Goal: Postpartum patient will be free of signs and symptoms of infection  Outcome: Met     Problem: Respiratory/Oxygenation Function Post-Surgical  Goal: Patient will achieve/maintain normal respiratory rate/effort  Outcome: Met     Problem: Bowel Elimination - Post Surgical  Goal: Patient will resume regular bowel sounds and function with no discomfort or distention  Outcome: Met     Problem: Early Mobilization - Post Surgery  Goal: Early mobilization post surgery  Outcome: Met       Patient is not progressing towards the following goals:

## 2023-02-28 NOTE — PROGRESS NOTES
Bedside report received from SONJA Donohue. Pt in bed resting comfortably at this time. Pt able to get up to restroom and void independently, denies need for pain medication at this time. Pt states that she will call if additional pain medication is needed. Whiteboard updated. POC discussed. Call light within reach. All questions and concerns answered at this time, advised to call for assistance as needed.

## 2023-02-28 NOTE — DISCHARGE PLANNING
Meds-to-Beds: Discharge prescription orders listed below delivered to patient's bedside. Patient counseled. Patient elected to have co-payment billed to patient account.       Current Outpatient Medications   Medication Sig Dispense Refill    docusate sodium 100 MG Cap Take 100 mg by mouth 2 times a day as needed for Constipation. 60 Capsule 0    ibuprofen (MOTRIN) 800 MG Tab Take 1 Tablet by mouth every 8 hours as needed for Moderate Pain. 30 Tablet 0      Clarisa Stephen, PharmD

## 2023-02-28 NOTE — PROGRESS NOTES
During rounds this morning patient complained of increased clitoral pain overnight.  Exam performed with RN, no abnormalities, lacerations, lesions around the clitoris.  Perineum intact.    Continue routine postpartum care.    Dayan Barrios MD  [facility]

## 2023-02-28 NOTE — DISCHARGE INSTRUCTIONS
PATIENT DISCHARGE EDUCATION INSTRUCTION SHEET    REASONS TO CALL YOUR OBSTETRICIAN  Persistent fever, shaking, chills (Temperature higher than 100.4) may indicate you have an infection  Heavy bleeding: soaking more than 1 pad per hour; Passing clots an egg-sized clot or bigger may mean you have an postpartum hemorrhage  Foul odor from vagina or bad smelling or discolored discharge or blood  Breast infection (Mastitis symptoms); breast pain, chills, fever, redness or red streaks, may feel flu like symptoms  Urinary pain, burning or frequency  Incision that is not healing, increased redness, swelling, tenderness or pain, or any pus from episiotomy or  site may mean you have an infection  Redness, swelling, warmth, or painful to touch in the calf area of your leg may mean you have a blood clot  Severe or intensified depression, thoughts or feelings of wanting to hurt yourself or someone else   Pain in chest, obstructed breathing or shortness of breath (trouble catching your breath) may mean you are having a postpartum complication. Call your provider immediately   Headache that does not get better, even after taking medicine, a bad headache with vision changes or pain in the upper right area of your belly may mean you have high blood pressure or post birth preeclampsia. Call your provider immediately    HAND WASHING  All family and friends should wash their hands:  Before and after holding the baby  Before feeding the baby  After using the restroom or changing the baby's diaper    WOUND CARE  Ask your physician for additional care instructions. In general:  Episiotomy/Laceration  May use nichole-spray bottle, witch hazel pads and dermaplast spray for comfort  Use nichole-spray bottle after urinating to cleanse perineal area  To prevent burning during urination spray nichole-water bottle on labial area   Pat perineal area dry until episiotomy/laceration is healed  Continue to use nichole-bottle until bleeding stops as  needed  If have a 2nd degree laceration or greater, a Sitz bath can offer relief from soreness, burning, and inflammation   Sitz Bath   Sit in 6 inches of warm water and soak laceration as needed until the laceration heals    VAGINAL CARE AND BLEEDING  Nothing inside vagina for 6 weeks:   No sexual intercourse, tampons or douching  Bleeding may continue for 2-4 weeks. Amount and color may vary  Soaking 1 pad or more in an hour for several hours is considered heavy bleeding  Passing large egg sized blood clots can be concerning  If you feel like you have heavy bleeding or are having increasing amount of blood clots call your Obstetrician immediately  If you begin feeling faint upon standing, feeling sick to your stomach, have clammy skin, a really fast heartbeat, have chills, start feeling confused, dizzy, sleepy or weak, or feeling like you're going to faint call your Obstetrician immediately    HYPERTENSION   Preeclampsia or gestational hypertension are types of high blood pressure that only pregnant women can get. It is important for you to be aware of symptoms to seek early intervention and treatment. If you have any of these symptoms immediately call your Obstetrician    Vision changes or blurred vision   Severe headache or pain that is unrelieved with medication and will not go away  Persistent pain in upper abdomen or shoulder   Increased swelling of face, feet, or hands  Difficulty breathing or shortness of breath at rest  Urinating less than usual    URINATION AND BOWEL MOVEMENTS  Eating more fiber (bran cereal, fruits, and vegetables) and drinking plenty of fluids will help to avoid constipation  Urinary frequency and urgency after childbirth is normal  If you experience any urinary pain, burning or frequency call your provider    BIRTH CONTROL  It is possible to become pregnant at any time after delivery and while breastfeeding  Plan to discuss a method of birth control with your physician at your post  "delivery follow up visit    POSTPARTUM BLUES  During the first few days after birth, you may experience a sense of the \"blues\" which may include impatience, irritability or even crying. These feelings come and go quickly. However, as many as 1 in 10 women experience emotional symptoms known as postpartum depression.     POSTPARTUM DEPRESSION    May start as early as the second or third day after delivery or take several weeks or months to develop. Symptoms of \"blues\" are present, but are more intense: Crying spells; loss of appetite; feelings of hopelessness or loss of control; fear of touching the baby; over concern or no concern at all about the baby; little or no concern about your own appearance/caring for yourself; and/or inability to sleep or excessive sleeping. Contact your Obstetrician if you are experiencing any of these symptoms     PREVENTING SHAKEN BABY  If you are angry or stressed, PUT THE BABY IN THE CRIB, step away, take some deep breaths, and wait until you are calm to care for the baby. DO NOT SHAKE THE BABY. You are not alone, call a supporter for help.  [facility] 24/7 crisis call line (535-830-7159) or (1-868.800.8894)  You can also text them, text \"ANSWER\" ([phone])  "

## 2023-02-28 NOTE — DISCHARGE SUMMARY
Discharge Summary:     Date of Admission: 2023  Date of Discharge: 23      Admitting diagnosis:    1. Pregnancy @ 39w5d      Discharge Diagnosis:   1. Status post spontaneous vaginal delivery.      PMHx: IBS  PSHx: denies  OB History    Para Term  AB Living   1 1 1     1   SAB IAB Ectopic Molar Multiple Live Births           0 1      # Outcome Date GA Lbr Pernell/2nd Weight Sex Delivery Anes PTL Lv   1 Term 23 39w5d  3.07 kg (6 lb 12.3 oz) M Vag-Spont EPI N ASHLEY       Patient has no known allergies.    Hospital Course:   Pt is a 32 y.o. now  who presented for desired term induction at 39 weeks 5 days.  Her induction was started with Pitocin.  Her labor progressed without incident, she did receive an epidural for labor analgesia.  Patient underwent normal spontaneous vaginal delivery, giving birth to a viable male infant with Apgars of 7 and 9, weight 3070 g.  The patient did have a second-degree midline laceration that was repaired in standard fashion.  The patient's postpartum course was unremarkable.  She remained afebrile with normal vital signs.  On postpartum day #2 the patient felt ready for discharge.  On day of discharge pt was ambulating, voiding spontaneously, tolerating PO, with adequate pain control, and desiring to go home.    Physical Exam:  Temp:  [36.3 °C (97.4 °F)-36.8 °C (98.2 °F)] 36.3 °C (97.4 °F)  Pulse:  [64-91] 64  Resp:  [17-18] 17  BP: (111-130)/(74-77) 111/74  SpO2:  [96 %-99 %] 96 %  Gen: AAO, NAD  Abd: soft, NT, ND, fundus firm below umbilicus  : NEFG, perineum intact; no gross abnormalities (chaperoned by RN- [facility])  Ext: NT, trace edema    Current Facility-Administered Medications   Medication Dose    LR infusion      oxytocin (PITOCIN) infusion (for post delivery)  125 mL/hr    oxytocin (PITOCIN) infusion (for induction)  0.5-20 tiffany-units/min    ropivacaine 0.2 % (NAROPIN) injection      lactated ringers infusion      docusate sodium (COLACE) capsule  100 mg  100 mg    ibuprofen (MOTRIN) tablet 800 mg  800 mg    acetaminophen (TYLENOL) tablet 1,000 mg  1,000 mg    tetanus-dipth-acell pertussis (Tdap) inj 0.5 mL  0.5 mL    measles, mumps and rubella vaccine (MMR) injection 0.5 mL  0.5 mL    PRN oxytocin (PITOCIN) (20 Units/1000 mL) PRN for excessive uterine bleeding - See Admin Instr  125-999 mL/hr    miSOPROStol (CYTOTEC) tablet 600 mcg  600 mcg    methylergonovine (METHERGINE) injection 0.2 mg  0.2 mg       Recent Labs     02/26/23  0700 02/27/23  0622   WBC 8.3 12.1*   RBC 4.09* 3.91*   HEMOGLOBIN 11.6* 10.6*   HEMATOCRIT 35.6* 33.5*   MCV 87.0 85.7   MCH 28.4 27.1   MCHC 32.6* 31.6*   RDW 43.1 43.6   PLATELETCT 167 134*   MPV 13.1* 13.3*           Activity/ Discharge Instructions::   Discharge to home  Pelvic Rest x 6 weeks  No heavy lifting x4 weeks  Call or come to ED for: heavy vaginal bleeding, fever >100.4, severe abdominal pain, severe headache, chest pain, shortness of breath,  N/V, abnormal vaginal discharge, or other concerns.       Follow up:  5-6wks for PP visit.     Discharge Meds:      Medication List        START taking these medications        Instructions   docusate sodium 100 MG Caps   Take 100 mg by mouth 2 times a day as needed for Constipation.  Dose: 100 mg     ibuprofen 800 MG Tabs  Commonly known as: MOTRIN   Take 1 Tablet by mouth every 8 hours as needed for Moderate Pain.  Dose: 800 mg            CONTINUE taking these medications        Instructions   PRENATAL VITAMIN PO   Take  by mouth.            STOP taking these medications      famotidine 20 MG Tabs  Commonly known as: PEPCID     OMEPRAZOLE PO                 Dayan Barrios MD

## 2023-03-23 ENCOUNTER — OFFICE VISIT (OUTPATIENT)
Dept: OBGYN | Facility: CLINIC | Age: 33
End: 2023-03-23
Payer: COMMERCIAL

## 2023-03-23 DIAGNOSIS — O92.70 LACTATION PROBLEM: ICD-10-CM

## 2023-03-23 PROCEDURE — 99999 PR NO CHARGE: CPT | Performed by: NURSE PRACTITIONER

## 2023-03-23 NOTE — PROGRESS NOTES
Summary: Magaly has done an excellent job managing exclusive pumping and bottle feeding. Attempted to breastfeed in the hospital, had difficulty latching. Started pumping and syringe feeding. Attempted to latch bare breast and with the nipple shield, both with a lot of difficulty. Now, pumping every 2-3 hours, occasionally 4 hours during the day. Up to 6 hours during the night. Averaging 140mls up to 260mls. Feeding baby every 3 hours day and night, offering 3oz at each feeding. Increased to 4oz today.   Today: Fed baby 4oz of expressed breastmilk, pacing well. Discussed plan moving forward.   Plan: Pump 7x every 24 hours, see suggested routine below. Feed baby every 2-3 hours, mostly 2.5 hours during the day. No need to wake baby for nighttime feeding. Baby needs 20-22oz every 24 hours. Discussed offering 3oz for most feedings, increasing to 4oz before bed and after longer stretch during the night.   Follow up:   Lactation appointment:  April 3, 2023  Baby 's Provider appointment: 2 Month Well Child Check   Referrals: Michaela Chahal, Therapist     Subjective:     Magaly Baker is a 32 y.o. female here for lactation care. She is here today with  babyFernando .    Concerns: Discuss exclusive pumping    HPI:   Pertinent  history:       FEEDING HISTORY:    Previous Breastfeeding History: First baby.   Hospital Course: Attempted to breastfeed, had difficulty latching. Started pumping and syringe feeding. Attempted to latch bare breast and with the nipple shield, both with a lot of difficulty.   Currently 3/23/2023: Pumping every 2-3 hours, occasionally 4 hours during the day. Up to 6 hours during the night. Averaging 140mls up to 260mls. Feeding baby every 3 hours day and night, offering 3oz at each feeding. Increased to 4oz today.     Both breasts: No     Supplement: Supplementation initiated inpatient and Expressed breast milk  Quantity: 3-4oz  How given/devices:  Bottle  Bottle/nipple type:  ComoTomo    Nipple Shield Use: None    Breast Pumping:  Frequency: every 2-6 hours  Quantity Obtained: 140-260mls  Type of Pump: Medela and MomCozy  Pain with pumping when starting     Maternal ROS:  Constitutional: No fever, chills. Feeling well  Breasts: No soreness of breasts and No soreness of nipples  Psychiatric: Managing ok  Mental Health: No mention of feeling irritable, agitated, angry, overwhelmed, apathetic, appetite changes, exhausted nor having sleep changes outside infant feeds/demands.  Current Outpatient Medications on File Prior to Visit   Medication Sig Dispense Refill    docusate sodium 100 MG Cap Take 100 mg by mouth 2 times a day as needed for Constipation. 60 Capsule 0    ibuprofen (MOTRIN) 800 MG Tab Take 1 Tablet by mouth every 8 hours as needed for Moderate Pain. 30 Tablet 0    Prenatal Vit-Fe Fumarate-FA (PRENATAL VITAMIN PO) Take  by mouth.       No current facility-administered medications on file prior to visit.      No past medical history on file.      Objective:     Maternal Physical Lactation Exam  General: No acute distress  Breasts: Symmetrical  and Soft  Nipples: intact and bleb  Psychiatric: Normal mood and affect. Her behavior is normal. Judgment and thought content normal.  Mental Health: Did NOT exhibit sadness, crying, feeling overwhelmed, agitation or hypervigilance.    Assessment/Plan & Lactation Counseling:     Infant Exam on Infant Chart    Infant Weight History:   02/26/2023: 6# 12.3oz  03/13/2023: 6# 12oz (Ped)  03/23/2023: 7# 10.9oz    Suck Pattern on the Bottle: Suck burst and normal rest  Behavior Following Observed Feeding: content    Milk Supply Available: oversupply      INFANT BREASTFEEDING PLAN  Discussed with family present detailed plan for establishing/maintaining family specific goals with breastfeeding available on Mom’s My Chart   Infant specific:     MATERNAL PERSONALIZED BREASTFEEDING PLAN  Discussed concerns and symptoms as listed above in assessment  "and guidance summarized below. Shared decision making was used between myself and the family for this encounter, home care, and follow up. Topics reviewed included:   4th Trimester: The 12-week period immediately after mom has had the baby. Not everyone has heard of it, but every mother and their  baby will go through it. It is a time of great physical and emotional change as the baby adjusts to being outside the womb, and the family adjusts to new life as parents  During the fourth trimester, one can expect fussiness and crying from the baby and very likely exhaustion for the family. [place] babies are learning to adjust to life outside the womb where it was warm and squishy!  There is a lot of misinformation about babies and their needs, and parents are often encouraged to ignore baby's signals. Bad idea. Babies are “half-baked” at birth and have much to learn with the help of physical and emotional support from caregivers. Taking care of a baby's needs is an investment that pays off with a happier, healthier child and adult.  It can take weeks or even months for your body to feel totally normal again. There is a major hormonal upheaval experienced by moms in the first few weeks after birth, because their bodies are shifting from many pregnancy hormones to a more normal hormonal make-up.  These first three months with baby earthside is a delicate time. Honor it with a mindful dose of support. Mindful Mamma's is an steffen that may help.   Self-Compassion through Relational Support and Interaction.   Be kind to ourself. This is the first step in reducing anxiety and depression. Pay attention to how you are doing.   What do you need? Food, Rest, Sleep, Support, to Laugh/giggle: who will you ask today? They want to help you. We want to help you.   How do you stop your self-blame, or your self-criticism?   Get out of the house each day, walk or drive somewhere or ask a friend to drive you,  a \"treat\" at a " drive through.   Mood and Anxiety Disorders: Having a new baby can be joyful time for some new moms, but a difficult time for others. For all, it is a time of profound physical and emotional change. Balancing baby, family, partners and friends, work, pets, and preexisting or new life stressors as well as sleep deprivation can be extremely challenging. Untreated depression, sleep exhaustion, and anxiety are each a challenge that must be addressed and in addition can contribute to early cessation of breastfeeding.   Referral and information to Michaela Chahal provided.   Hydration: Staying hydrated is important however lack of hydration is usually not a cause of significantly low milk production.  Everyone needs a different amount of water, depending on their activity and diet. A high salt and/or high-protein diet, high physical activity, or very warm weather/sweating will require more fluids. A person eating a diet high in veggies and fruit, with a lack of physical activity will require fewer fluids. There is no magic number for the # of ounces of water each day.The best way to know that you are well hydrated is by looking at your urine.  Urine should look clear to light pale yellow, and you should need to pee at least every 3 to 4 hours unless you have a large bladder capacity.  Herbs and Medications  A galactagogue is an herb or medication taken by a breastfeeding mother to increase her milk supply. We know that for centuries mothers around the world have sought out remedies to increase their milk supply. However, there is limited research on the safety and effectiveness of herbal galactagogues, which makes it hard for us to endorse them. It is not known if any of these herbs are truly effective, and it is difficult to predict how a specific herbal galactagogue will affect an individual, requiring “trial and error” in many situations. When effective, results are generally seen within 24-72 hours of starting an  herbal galactagogue. Many of these herbs are used to decrease high blood sugar. If you are diabetic or have problems with low blood sugar, or thyroid disease you may not be a candidate for herbs. Not all women can increase their low milk supply with a galactagogue due to the many underlying causes of low milk production.  When taking a galactagogue, remember that frequent milk removal is still the most effective way to increase supply.   Feeding:   Infant feeding well given current interval growth, guidelines to follow:  Feed your baby every 2-3 hours, more often if baby acts hungry.   Awaken baby for feeding if going over 3 hours in the day.   No need to wake baby for nighttime feedings   Bottles    Baby needs 20-22oz every 24 hours  Pumping Guidelines:  Both breasts 7x daily   Type of Pump:  Medela and MomCozy  How long will vary woman to woman, typically 10-15 minutes, or 1-2 minutes after flow slows  Flange Fit: Freely moving nipple in the tunnel with some movement of the areola.  Caution with Breast Massage The word “Massage” means different things in the breastfeeding world. It is described and interpreted in so many different ways and unfortunately potentially harmful. The hands can be safe on a breast and  gentle to help in many ways but they also can be damaging when used in the wrong way.  Lymphatic drainage massage is appropriate,  open hand , lightly stroking only, and can be highly effective. The massage advice to knead , massage deeply , vibrate with marketed tools is  most concerning. Be gentle with this gland to not increase inflammation.   Storage (Acceptable guidelines for healthy term babies)  10 hours at room temp including your pieces, may rinse but not mandatory  8 days refrigerator, don't need  to refrigerate right away if using fresh pumped milk at the next feeding  Freezer 1 year  Deep freezer 2 years  American Academy or Pediatrics has said you may mix different temperature milks.   If baby  drinks breastmilk from a fresh or refrigerated bottle of breastmilk,  you may return the unused portion to the refrigerator and use once at next feeding.   Breast Care  Plugs   Breast rest - No Massage, don't overfeed of over pump, down regulate production if needed  Advil 800mg every 8 hours for 48 hours with food  Ice - 10 minutes  after feeding then every 30 minutes or as desired for repeating the ice. Don't put the ice directly on the skin.  May add Tylenol 1000mg every 8 hours for 48 hours in between the Advil dosing if needed for pain.  Answers to FAQs: https://www.infantAquafadask.com/category/breastfeeding  Alcohol & Breastfeeding: What's your time-to-zero?  Cough & Cold Medications while Breastfeeding  Vitamin D Supplementation and Breastfeeding  Antidepressant Use While Breastfeeding: What should I know?  Breastfeeding, Caffeine, and Energy Drinks  Connect with other mothers:  Facebook:   [place] Breastfeeds: https://www.Heliae.GetO2/[place].breastfeeds/  Well-Nourished Babies (Private group for questions and support): https://www.Heliae.com/groups/803458632031626/  Breastfeeding Goessel including opportunity to weight your baby and do before and after weights WEIGHT CHECKS  Tuesdays 10am - 11am. Women's Health at [address] [place] [address], [facility] [address] [place] [address], 3rd floor conference room  Check your baby's weight, do a feeding and see how your baby is growing, visit with other mothers, plan on a walk or coffee date after group.  Please download Growth: Baby and Child for Apple or Child Growth Tracker for [employer] if you would like to  document and follow your baby's growth curve.  Please wear a mask coming and going: you may remove it in the classroom  Due to space limitations - limit strollers please (New c/section moms please use your stroller).  We would love to have dads stay, but moms won't breastfeed if there are men in the room, sorry.  The room is generally scheduled for another event following group.  Please take all  diapers with you   ONLINE SUPPORT GROUPS  Postpartum Support International (PSI) support groups are conducted using a gsry-[id number]-[id number] support model and are not intended for those experiencing a mental health crisis. Groups are 90 minutes (1.5 hours) in length. The first ~30 minutes is providing information, education, and establishing group guidelines. The next ~60 minutes is “talk time,” in which group members share and talk with each other. Group members must be present for the group guidelines before joining in the discussion or “talk time.”         In Conclusion:   Managing breastfeeding and milk supply is very dynamic,can be a complex and intimate journey, and is not one size fits all. When obstacles present themselves, it takes confidence, persistence and support. The rights of the child include optimal nutrition and mothers need help to make informed decisions. You  and your baby have been screened for biological, psychological, and social risk factors that might interfere with achieving your goals.  Support is critical. We want the family thriving not just tolerating life. You are now the focus of our Breastfeeding Medicine team; we are here to support your decisions and vision.     Follow up requires close monitoring in this time sensitive window of opportunity to establish milk supply and facilitate the learning of  breastfeeding.    Please call [phone] our voicemail line or the front office at [phone] to scheduled your next appointment.  Family is encouraged to e-mail or mychart us to update how the plan is working.    A total of 75 minutes, including infant assessment time, with more than 50% was spent preparing to see the patient, obtaining and reviewing separately obtained history, performing a medically appropriate examination and evaluation, counseling and educating the family, referring and communicating with other health care professionals, documenting clinical information in the electronic health  record, independently interpreting weighted feeds and infant growth results, communicating these results to the family and care coordination as detailed in the above note.       Niru Knapp

## 2023-04-03 ENCOUNTER — OFFICE VISIT (OUTPATIENT)
Dept: OBGYN | Facility: CLINIC | Age: 33
End: 2023-04-03
Payer: COMMERCIAL

## 2023-04-03 DIAGNOSIS — O92.70 LACTATION PROBLEM: ICD-10-CM

## 2023-04-03 PROCEDURE — 99999 PR NO CHARGE: CPT | Performed by: NURSE PRACTITIONER

## 2023-08-30 ENCOUNTER — OFFICE VISIT (OUTPATIENT)
Dept: MEDICAL GROUP | Facility: MEDICAL CENTER | Age: 33
End: 2023-08-30
Payer: COMMERCIAL

## 2023-08-30 VITALS
WEIGHT: 145 LBS | BODY MASS INDEX: 26.68 KG/M2 | HEART RATE: 64 BPM | TEMPERATURE: 98.3 F | DIASTOLIC BLOOD PRESSURE: 60 MMHG | OXYGEN SATURATION: 96 % | RESPIRATION RATE: 16 BRPM | HEIGHT: 62 IN | SYSTOLIC BLOOD PRESSURE: 110 MMHG

## 2023-08-30 DIAGNOSIS — E78.00 ELEVATED LDL CHOLESTEROL LEVEL: ICD-10-CM

## 2023-08-30 DIAGNOSIS — Z91.018 FOOD ALLERGY: ICD-10-CM

## 2023-08-30 DIAGNOSIS — D64.9 ANEMIA, UNSPECIFIED TYPE: ICD-10-CM

## 2023-08-30 DIAGNOSIS — Z13.1 SCREENING FOR DIABETES MELLITUS: ICD-10-CM

## 2023-08-30 DIAGNOSIS — K21.9 GASTROESOPHAGEAL REFLUX DISEASE WITHOUT ESOPHAGITIS: ICD-10-CM

## 2023-08-30 DIAGNOSIS — F41.9 ANXIETY AND DEPRESSION: ICD-10-CM

## 2023-08-30 DIAGNOSIS — F32.A ANXIETY AND DEPRESSION: ICD-10-CM

## 2023-08-30 DIAGNOSIS — R63.5 WEIGHT GAIN: ICD-10-CM

## 2023-08-30 DIAGNOSIS — Z11.59 NEED FOR HEPATITIS C SCREENING TEST: ICD-10-CM

## 2023-08-30 DIAGNOSIS — Z11.4 SCREENING FOR HIV (HUMAN IMMUNODEFICIENCY VIRUS): ICD-10-CM

## 2023-08-30 PROBLEM — O9A.213 TRAUMATIC INJURY DURING PREGNANCY, ANTEPARTUM, THIRD TRIMESTER: Status: RESOLVED | Noted: 2022-12-27 | Resolved: 2023-08-30

## 2023-08-30 PROCEDURE — 3078F DIAST BP <80 MM HG: CPT | Performed by: FAMILY MEDICINE

## 2023-08-30 PROCEDURE — 3074F SYST BP LT 130 MM HG: CPT | Performed by: FAMILY MEDICINE

## 2023-08-30 PROCEDURE — 99204 OFFICE O/P NEW MOD 45 MIN: CPT | Performed by: FAMILY MEDICINE

## 2023-08-30 RX ORDER — OMEPRAZOLE 20 MG/1
20 CAPSULE, DELAYED RELEASE ORAL DAILY
COMMUNITY

## 2023-08-30 ASSESSMENT — ENCOUNTER SYMPTOMS
CONSTIPATION: 0
DIARRHEA: 0
PALPITATIONS: 0
COUGH: 0
HEARTBURN: 1
WHEEZING: 0
ABDOMINAL PAIN: 0
SHORTNESS OF BREATH: 0
VOMITING: 0
CHILLS: 0
FEVER: 0
NAUSEA: 0

## 2023-08-30 ASSESSMENT — FIBROSIS 4 INDEX: FIB4 SCORE: 1.03

## 2023-08-30 NOTE — ASSESSMENT & PLAN NOTE
New problem, unstable, will check her labs including CBC, CMP, TSH.  Discussed to continue working out and eat healthy diet.  Monitor weight closely.

## 2023-08-30 NOTE — ASSESSMENT & PLAN NOTE
Chronic problem, unstable, continue omeprazole 20 mg daily.  We will check celiac disease panel, H. pylori testing as well as inflammatory markers.  We will send her to gastroenterology at next visit after getting all these results.

## 2023-08-30 NOTE — ASSESSMENT & PLAN NOTE
Chronic problem, stable, continue to do exercise as that helps with anxiety symptoms.  Continue to follow-up with therapist.  Monitor symptoms closely

## 2023-08-30 NOTE — PROGRESS NOTES
FAMILY MEDICINE VISIT                                                               Chief complaint::Diagnoses of Anemia, unspecified type, Gastroesophageal reflux disease without esophagitis, Elevated LDL cholesterol level, Weight gain, Screening for diabetes mellitus, Screening for HIV (human immunodeficiency virus), Need for hepatitis C screening test, Food allergy, and Anxiety and depression were pertinent to this visit.    History of present illness: Magaly Baker is a 32 y.o. female who presented to establish care, GI issues.    Problem   Anemia    Last CBC 6 months ago showed hemoglobin low at 10.6.  She is 6 months postpartum.     Gastroesophageal Reflux Disease Without Esophagitis    She has long-term history of reflux symptoms and currently takes omeprazole she has followed up with GI consultant on digestive health about this.  She would like to get tested for celiac disease.  She reports that her informatory markers were elevated when previously checked     Elevated Ldl Cholesterol Level    Last blood test in 2018 showed elevated LDL at 114    Lab Results   Component Value Date/Time    CHOLSTRLTOT 174 11/14/2018 0840    TRIGLYCERIDE 45 11/14/2018 0840    HDL 51 11/14/2018 0840     (H) 11/14/2018 0840        Weight Gain    She is 6 months postpartum, she gained weight during pregnancy.  Her prepregnancy weight was 120 pounds.  She is currently 145 pounds.  She has been eating healthy and has been trying to work out to lose weight.     Food Allergy    She has multiple GI issues going on.  She would like to get tested for food allergies     Anxiety and Depression    Has history of anxiety and depression symptoms, currently following with therapist.  We will manage symptoms.  Working out helps with anxiety symptoms.           Review of systems:     Review of Systems   Constitutional:  Negative for chills and fever.   Respiratory:  Negative for cough, shortness of breath and wheezing.   "  Cardiovascular:  Negative for palpitations and leg swelling.        Chest pain sometimes with anxiety symptoms.   Gastrointestinal:  Positive for heartburn. Negative for abdominal pain, constipation, diarrhea, nausea and vomiting.   Psychiatric/Behavioral:          Stress        Medications and Allergies:     Current Outpatient Medications   Medication Sig Dispense Refill    omeprazole (PRILOSEC) 20 MG delayed-release capsule Take 20 mg by mouth every day.       No current facility-administered medications for this visit.          Vitals:    /60   Pulse 64   Temp 36.8 °C (98.3 °F)   Resp 16   Ht 1.575 m (5' 2\")   Wt 65.8 kg (145 lb)   SpO2 96%  Body mass index is 26.52 kg/m².    Physical Exam:     Physical Exam  Constitutional:       Appearance: Normal appearance. She is well-developed and well-groomed.   HENT:      Head: Normocephalic and atraumatic.      Right Ear: Tympanic membrane, ear canal and external ear normal.      Left Ear: Tympanic membrane, ear canal and external ear normal.      Nose: Nose normal.      Mouth/Throat:      Mouth: Mucous membranes are moist.      Pharynx: No oropharyngeal exudate or posterior oropharyngeal erythema.   Eyes:      General:         Right eye: No discharge.         Left eye: No discharge.      Conjunctiva/sclera: Conjunctivae normal.   Cardiovascular:      Rate and Rhythm: Normal rate and regular rhythm.      Heart sounds: Normal heart sounds. No murmur heard.     No friction rub. No gallop.   Pulmonary:      Effort: Pulmonary effort is normal. No respiratory distress.      Breath sounds: Normal breath sounds. No wheezing or rales.   Neurological:      General: No focal deficit present.      Mental Status: She is alert. Mental status is at baseline.      Gait: Gait is intact.   Psychiatric:         Mood and Affect: Mood and affect normal.         Behavior: Behavior normal.          I reviewed recent lab results from 2/26/2023 and " 2/27/2023.    Assessment/Plan:         Problem List Items Addressed This Visit       Anemia     Chronic problem, unstable, check CBC, iron and ferritin level.         Relevant Orders    CBC WITH DIFFERENTIAL    IRON/TOTAL IRON BIND    FERRITIN    Gastroesophageal reflux disease without esophagitis     Chronic problem, unstable, continue omeprazole 20 mg daily.  We will check celiac disease panel, H. pylori testing as well as inflammatory markers.  We will send her to gastroenterology at next visit after getting all these results.         Relevant Medications    omeprazole (PRILOSEC) 20 MG delayed-release capsule    Other Relevant Orders    CELIAC DISEASE AB PANEL    H.PYLORI STOOL ANTIGEN    Elevated LDL cholesterol level     Chronic problem, mildly elevated LDL level, recheck labs to assess control.         Relevant Orders    Comp Metabolic Panel    Lipid Profile    Weight gain     New problem, unstable, will check her labs including CBC, CMP, TSH.  Discussed to continue working out and eat healthy diet.  Monitor weight closely.         Relevant Orders    TSH WITH REFLEX TO FT4    Sed Rate    CRP QUANTITIVE (NON-CARDIAC)    Food allergy     Chronic problem, unstable, referral to allergist for allergy testing         Relevant Orders    Referral to Allergy    Anxiety and depression     Chronic problem, stable, continue to do exercise as that helps with anxiety symptoms.  Continue to follow-up with therapist.  Monitor symptoms closely          Other Visit Diagnoses       Screening for diabetes mellitus        Relevant Orders    HEMOGLOBIN A1C    Screening for HIV (human immunodeficiency virus)        Relevant Orders    HIV AG/AB COMBO ASSAY SCREENING    Need for hepatitis C screening test        Relevant Orders    HEP C VIRUS ANTIBODY             Please note that this dictation was created using voice recognition software. I have made every reasonable attempt to correct obvious errors, but I expect that there are  errors of grammar and possibly content that I did not discover before finalizing the note.    Follow up in 6 to 8 weeks for follow-up for labs.  Request Pap smear records.

## 2023-08-30 NOTE — LETTER
[facility]  You Roe M.D.  [address]  [place] [address]  Fax: [phone]   Authorization for Release/Disclosure of   Protected Health Information   Name: MAGALY EPPS : 1990 SSN: [id number]   Address: [facility] [address] [place] [address] Phone:    [phone] (home)    I authorize the entity listed below to release/disclose the PHI below to:   [facility]/You Roe M.D. and You Roe M.D.   Provider or Entity Name:     Address   City, State, Zip   Phone:      Fax:     Reason for request: continuity of care   Information to be released:    [  ] LAST COLONOSCOPY,  including any PATH REPORT and follow-up  [  ] LAST FIT/COLOGUARD RESULT [  ] LAST DEXA  [  ] LAST MAMMOGRAM  [  ] LAST PAP  [  ] LAST LABS [  ] RETINA EXAM REPORT  [  ] IMMUNIZATION RECORDS  [  ] Release all info      [  ] Check here and initial the line next to each item to release ALL health information INCLUDING  _____ Care and treatment for drug and / or alcohol abuse  _____ HIV testing, infection status, or AIDS  _____ Genetic Testing    DATES OF SERVICE OR TIME PERIOD TO BE DISCLOSED: _____________  I understand and acknowledge that:  * This Authorization may be revoked at any time by you in writing, except if your health information has already been used or disclosed.  * Your health information that will be used or disclosed as a result of you signing this authorization could be re-disclosed by the recipient. If this occurs, your re-disclosed health information may no longer be protected by State or Federal laws.  * You may refuse to sign this Authorization. Your refusal will not affect your ability to obtain treatment.  * This Authorization becomes effective upon signing and will  on (date) __________.      If no date is indicated, this Authorization will  one (1) year from the signature date.    Name: Magaly Epps  Signature: Date:   2023     PLEASE FAX REQUESTED  RECORDS BACK TO: [phone]

## 2023-10-10 ENCOUNTER — HOSPITAL ENCOUNTER (OUTPATIENT)
Dept: LAB | Facility: MEDICAL CENTER | Age: 33
End: 2023-10-10
Attending: FAMILY MEDICINE
Payer: COMMERCIAL

## 2023-10-10 DIAGNOSIS — Z11.59 NEED FOR HEPATITIS C SCREENING TEST: ICD-10-CM

## 2023-10-10 DIAGNOSIS — K21.9 GASTROESOPHAGEAL REFLUX DISEASE WITHOUT ESOPHAGITIS: ICD-10-CM

## 2023-10-10 DIAGNOSIS — E78.00 ELEVATED LDL CHOLESTEROL LEVEL: ICD-10-CM

## 2023-10-10 DIAGNOSIS — D64.9 ANEMIA, UNSPECIFIED TYPE: ICD-10-CM

## 2023-10-10 DIAGNOSIS — R63.5 WEIGHT GAIN: ICD-10-CM

## 2023-10-10 DIAGNOSIS — Z11.4 SCREENING FOR HIV (HUMAN IMMUNODEFICIENCY VIRUS): ICD-10-CM

## 2023-10-10 DIAGNOSIS — Z13.1 SCREENING FOR DIABETES MELLITUS: ICD-10-CM

## 2023-10-10 LAB
ALBUMIN SERPL BCP-MCNC: 4.8 G/DL (ref 3.2–4.9)
ALBUMIN/GLOB SERPL: 1.9 G/DL
ALP SERPL-CCNC: 72 U/L (ref 30–99)
ALT SERPL-CCNC: 14 U/L (ref 2–50)
ANION GAP SERPL CALC-SCNC: 11 MMOL/L (ref 7–16)
AST SERPL-CCNC: 19 U/L (ref 12–45)
BASOPHILS # BLD AUTO: 0.9 % (ref 0–1.8)
BASOPHILS # BLD: 0.06 K/UL (ref 0–0.12)
BILIRUB SERPL-MCNC: 0.9 MG/DL (ref 0.1–1.5)
BUN SERPL-MCNC: 12 MG/DL (ref 8–22)
CALCIUM ALBUM COR SERPL-MCNC: 8.9 MG/DL (ref 8.5–10.5)
CALCIUM SERPL-MCNC: 9.5 MG/DL (ref 8.5–10.5)
CHLORIDE SERPL-SCNC: 104 MMOL/L (ref 96–112)
CHOLEST SERPL-MCNC: 182 MG/DL (ref 100–199)
CO2 SERPL-SCNC: 27 MMOL/L (ref 20–33)
CREAT SERPL-MCNC: 0.6 MG/DL (ref 0.5–1.4)
CRP SERPL HS-MCNC: 1.93 MG/DL (ref 0–0.75)
EOSINOPHIL # BLD AUTO: 0.2 K/UL (ref 0–0.51)
EOSINOPHIL NFR BLD: 3.1 % (ref 0–6.9)
ERYTHROCYTE [DISTWIDTH] IN BLOOD BY AUTOMATED COUNT: 45.1 FL (ref 35.9–50)
ERYTHROCYTE [SEDIMENTATION RATE] IN BLOOD BY WESTERGREN METHOD: 6 MM/HOUR (ref 0–25)
FASTING STATUS PATIENT QL REPORTED: NORMAL
FERRITIN SERPL-MCNC: 47.2 NG/ML (ref 10–291)
GFR SERPLBLD CREATININE-BSD FMLA CKD-EPI: 123 ML/MIN/1.73 M 2
GLOBULIN SER CALC-MCNC: 2.5 G/DL (ref 1.9–3.5)
GLUCOSE SERPL-MCNC: 76 MG/DL (ref 65–99)
HCT VFR BLD AUTO: 46.9 % (ref 37–47)
HCV AB SER QL: NORMAL
HDLC SERPL-MCNC: 45 MG/DL
HGB BLD-MCNC: 14.8 G/DL (ref 12–16)
HIV 1+2 AB+HIV1 P24 AG SERPL QL IA: NORMAL
IMM GRANULOCYTES # BLD AUTO: 0.01 K/UL (ref 0–0.11)
IMM GRANULOCYTES NFR BLD AUTO: 0.2 % (ref 0–0.9)
IRON SATN MFR SERPL: 12 % (ref 15–55)
IRON SERPL-MCNC: 34 UG/DL (ref 40–170)
LDLC SERPL CALC-MCNC: 126 MG/DL
LYMPHOCYTES # BLD AUTO: 1.42 K/UL (ref 1–4.8)
LYMPHOCYTES NFR BLD: 21.8 % (ref 22–41)
MCH RBC QN AUTO: 27.9 PG (ref 27–33)
MCHC RBC AUTO-ENTMCNC: 31.6 G/DL (ref 32.2–35.5)
MCV RBC AUTO: 88.3 FL (ref 81.4–97.8)
MONOCYTES # BLD AUTO: 0.53 K/UL (ref 0–0.85)
MONOCYTES NFR BLD AUTO: 8.2 % (ref 0–13.4)
NEUTROPHILS # BLD AUTO: 4.28 K/UL (ref 1.82–7.42)
NEUTROPHILS NFR BLD: 65.8 % (ref 44–72)
NRBC # BLD AUTO: 0 K/UL
NRBC BLD-RTO: 0 /100 WBC (ref 0–0.2)
PLATELET # BLD AUTO: 185 K/UL (ref 164–446)
PMV BLD AUTO: 12.2 FL (ref 9–12.9)
POTASSIUM SERPL-SCNC: 4.1 MMOL/L (ref 3.6–5.5)
PROT SERPL-MCNC: 7.3 G/DL (ref 6–8.2)
RBC # BLD AUTO: 5.31 M/UL (ref 4.2–5.4)
SODIUM SERPL-SCNC: 142 MMOL/L (ref 135–145)
TIBC SERPL-MCNC: 292 UG/DL (ref 250–450)
TRIGL SERPL-MCNC: 53 MG/DL (ref 0–149)
TSH SERPL DL<=0.005 MIU/L-ACNC: 1.47 UIU/ML (ref 0.38–5.33)
UIBC SERPL-MCNC: 258 UG/DL (ref 110–370)
WBC # BLD AUTO: 6.5 K/UL (ref 4.8–10.8)

## 2023-10-10 PROCEDURE — 36415 COLL VENOUS BLD VENIPUNCTURE: CPT

## 2023-10-10 PROCEDURE — 80053 COMPREHEN METABOLIC PANEL: CPT

## 2023-10-10 PROCEDURE — 86803 HEPATITIS C AB TEST: CPT

## 2023-10-10 PROCEDURE — 82728 ASSAY OF FERRITIN: CPT

## 2023-10-10 PROCEDURE — 86364 TISS TRNSGLTMNASE EA IG CLAS: CPT

## 2023-10-10 PROCEDURE — 87389 HIV-1 AG W/HIV-1&-2 AB AG IA: CPT

## 2023-10-10 PROCEDURE — 83036 HEMOGLOBIN GLYCOSYLATED A1C: CPT

## 2023-10-10 PROCEDURE — 84443 ASSAY THYROID STIM HORMONE: CPT

## 2023-10-10 PROCEDURE — 83540 ASSAY OF IRON: CPT

## 2023-10-10 PROCEDURE — 85025 COMPLETE CBC W/AUTO DIFF WBC: CPT

## 2023-10-10 PROCEDURE — 80061 LIPID PANEL: CPT

## 2023-10-10 PROCEDURE — 86140 C-REACTIVE PROTEIN: CPT

## 2023-10-10 PROCEDURE — 83550 IRON BINDING TEST: CPT

## 2023-10-10 PROCEDURE — 85652 RBC SED RATE AUTOMATED: CPT

## 2023-10-10 PROCEDURE — 82784 ASSAY IGA/IGD/IGG/IGM EACH: CPT

## 2023-10-11 ENCOUNTER — HOSPITAL ENCOUNTER (OUTPATIENT)
Facility: MEDICAL CENTER | Age: 33
End: 2023-10-11
Attending: FAMILY MEDICINE
Payer: COMMERCIAL

## 2023-10-11 ENCOUNTER — OFFICE VISIT (OUTPATIENT)
Dept: MEDICAL GROUP | Facility: MEDICAL CENTER | Age: 33
End: 2023-10-11
Payer: COMMERCIAL

## 2023-10-11 VITALS
DIASTOLIC BLOOD PRESSURE: 62 MMHG | OXYGEN SATURATION: 96 % | SYSTOLIC BLOOD PRESSURE: 114 MMHG | HEIGHT: 62 IN | HEART RATE: 108 BPM | WEIGHT: 144 LBS | RESPIRATION RATE: 16 BRPM | BODY MASS INDEX: 26.5 KG/M2 | TEMPERATURE: 98.5 F

## 2023-10-11 DIAGNOSIS — E78.00 ELEVATED LDL CHOLESTEROL LEVEL: ICD-10-CM

## 2023-10-11 DIAGNOSIS — K21.9 GASTROESOPHAGEAL REFLUX DISEASE WITHOUT ESOPHAGITIS: ICD-10-CM

## 2023-10-11 DIAGNOSIS — R79.82 ELEVATED C-REACTIVE PROTEIN (CRP): ICD-10-CM

## 2023-10-11 DIAGNOSIS — D64.9 ANEMIA, UNSPECIFIED TYPE: ICD-10-CM

## 2023-10-11 LAB
EST. AVERAGE GLUCOSE BLD GHB EST-MCNC: 97 MG/DL
H PYLORI AG STL QL IA: NOT DETECTED
HBA1C MFR BLD: 5 % (ref 4–5.6)

## 2023-10-11 PROCEDURE — 3078F DIAST BP <80 MM HG: CPT | Performed by: FAMILY MEDICINE

## 2023-10-11 PROCEDURE — 3074F SYST BP LT 130 MM HG: CPT | Performed by: FAMILY MEDICINE

## 2023-10-11 PROCEDURE — 99214 OFFICE O/P EST MOD 30 MIN: CPT | Performed by: FAMILY MEDICINE

## 2023-10-11 ASSESSMENT — ENCOUNTER SYMPTOMS
ABDOMINAL PAIN: 1
PALPITATIONS: 0
FEVER: 0
DIARRHEA: 1
HEARTBURN: 1
CHILLS: 0

## 2023-10-11 ASSESSMENT — FIBROSIS 4 INDEX: FIB4 SCORE: 0.82

## 2023-10-11 NOTE — PROGRESS NOTES
"FAMILY MEDICINE VISIT                                                               Chief complaint::Diagnoses of Anemia, unspecified type, Elevated LDL cholesterol level, and Elevated C-reactive protein (CRP) were pertinent to this visit.    History of present illness: Magaly Baker is a 32 y.o. female who presented for lab follow-up.    We reviewed her labs, her CBC showed significant improvement in hemoglobin.  Her iron levels are low.  Ferritin is normal.  LDL came back mildly elevated, other cholesterol numbers came back normal.  She has elevated CRP level.  H. pylori and celiac disease panel results are pending.  She is going to follow-up with gastroenterology to discuss about getting endoscopy and colonoscopy for evaluation for her GI symptoms      Review of systems:     Review of Systems   Constitutional:  Negative for chills and fever.   Cardiovascular:  Negative for chest pain, palpitations and leg swelling.   Gastrointestinal:  Positive for abdominal pain, diarrhea and heartburn.        Medications and Allergies:     Current Outpatient Medications   Medication Sig Dispense Refill    omeprazole (PRILOSEC) 20 MG delayed-release capsule Take 20 mg by mouth every day.       No current facility-administered medications for this visit.          Vitals:    /62   Pulse (!) 108   Temp 36.9 °C (98.5 °F) (Temporal)   Resp 16   Ht 1.575 m (5' 2\")   Wt 65.3 kg (144 lb)   SpO2 96%  Body mass index is 26.34 kg/m².    Physical Exam:     Physical Exam  Constitutional:       Appearance: Normal appearance. She is well-developed and well-groomed.   HENT:      Head: Normocephalic and atraumatic.      Right Ear: External ear normal.      Left Ear: External ear normal.   Eyes:      General:         Right eye: No discharge.         Left eye: No discharge.      Conjunctiva/sclera: Conjunctivae normal.   Cardiovascular:      Rate and Rhythm: Normal rate.   Pulmonary:      Effort: Pulmonary effort is normal. No " respiratory distress.   Musculoskeletal:      Cervical back: Neck supple.   Skin:     Findings: No rash.   Neurological:      Mental Status: She is alert.   Psychiatric:         Mood and Affect: Mood and affect normal.         Behavior: Behavior normal.          Labs:  I reviewed with patient recent labs resulted on 10/10/2023    Assessment/Plan:         1. Anemia, unspecified type  Chronic problem, stable, hemoglobin is improving.  Iron level is low.  Recommended start taking iron supplementation.  We will recheck labs in 4 months.    - CBC WITH DIFFERENTIAL; Future  - IRON/TOTAL IRON BIND; Future  - FERRITIN; Future    2. Elevated LDL cholesterol level  Chronic problem, mild elevated LDL level, she recently started doing whole 30 diet.  Eat healthy diet and exercise 150 minutes in a week.  Recheck labs in 3 to 4 months.    - Lipid Profile; Future    3. Elevated C-reactive protein (CRP)  New problem, mildly elevated CRP level, awaiting H. pylori and celiac disease panel.  Follow-up with gastro for doing endoscopy and colonoscopy.  If these are normal then will do further evaluation with imaging.    - CRP QUANTITIVE (NON-CARDIAC); Future     Please note that this dictation was created using voice recognition software. I have made every reasonable attempt to correct obvious errors, but I expect that there are errors of grammar and possibly content that I did not discover before finalizing the note.    Follow up in 3 to 4-month for lab follow-up.

## 2023-10-12 LAB
IGA SERPL-MCNC: 86 MG/DL (ref 68–408)
TTG IGA SER IA-ACNC: <2 U/ML (ref 0–3)

## 2023-11-16 ENCOUNTER — HOSPITAL ENCOUNTER (EMERGENCY)
Facility: MEDICAL CENTER | Age: 33
End: 2023-11-16
Attending: EMERGENCY MEDICINE
Payer: COMMERCIAL

## 2023-11-16 ENCOUNTER — APPOINTMENT (OUTPATIENT)
Dept: RADIOLOGY | Facility: MEDICAL CENTER | Age: 33
End: 2023-11-16
Attending: EMERGENCY MEDICINE
Payer: COMMERCIAL

## 2023-11-16 VITALS
RESPIRATION RATE: 16 BRPM | DIASTOLIC BLOOD PRESSURE: 66 MMHG | HEIGHT: 62 IN | TEMPERATURE: 98.2 F | OXYGEN SATURATION: 96 % | HEART RATE: 56 BPM | WEIGHT: 139.99 LBS | BODY MASS INDEX: 25.76 KG/M2 | SYSTOLIC BLOOD PRESSURE: 108 MMHG

## 2023-11-16 DIAGNOSIS — R07.89 OTHER CHEST PAIN: ICD-10-CM

## 2023-11-16 DIAGNOSIS — B33.8 RSV INFECTION: ICD-10-CM

## 2023-11-16 LAB
EKG IMPRESSION: NORMAL
FLUAV RNA SPEC QL NAA+PROBE: NEGATIVE
FLUBV RNA SPEC QL NAA+PROBE: NEGATIVE
RSV RNA SPEC QL NAA+PROBE: POSITIVE
SARS-COV-2 RNA RESP QL NAA+PROBE: NOTDETECTED
SPECIMEN SOURCE: ABNORMAL

## 2023-11-16 PROCEDURE — 99283 EMERGENCY DEPT VISIT LOW MDM: CPT

## 2023-11-16 PROCEDURE — 93005 ELECTROCARDIOGRAM TRACING: CPT | Performed by: EMERGENCY MEDICINE

## 2023-11-16 PROCEDURE — 0241U HCHG SARS-COV-2 COVID-19 NFCT DS RESP RNA 4 TRGT MIC: CPT

## 2023-11-16 PROCEDURE — 71045 X-RAY EXAM CHEST 1 VIEW: CPT

## 2023-11-16 PROCEDURE — C9803 HOPD COVID-19 SPEC COLLECT: HCPCS | Performed by: EMERGENCY MEDICINE

## 2023-11-16 PROCEDURE — 93005 ELECTROCARDIOGRAM TRACING: CPT

## 2023-11-16 ASSESSMENT — FIBROSIS 4 INDEX: FIB4 SCORE: 0.82

## 2023-11-17 NOTE — DISCHARGE INSTRUCTIONS
We could not find a dangerous cause of your pain.  It is likely related to your RSV infection.  You are low risk for blood clot.  Rest and drink fluids and take ibuprofen and/or Tylenol as needed for pain.  Return to the ER for changing chest pain, shortness of breath, rapid heart rate or painful leg swelling.  None of this is expected.

## 2023-11-17 NOTE — ED PROVIDER NOTES
ED Provider Note    Scribed for Cezar Fonseca M.D. by Simi Carpenter. 11/16/2023  5:45 PM    Primary care provider: You Roe M.D.  Means of arrival: Walk-In    CHIEF COMPLAINT  Chief Complaint   Patient presents with    Chest Pain    Cough    Sore Throat     31 yo female ambulates to triage with reports of cold like symptoms since Saturday.  Patient reports non radiating chest pain left upper chest since yesterday.  Reports some SOB but thinks this is related to her cold symptoms. Denies N/V or fever        LIMITATION TO HISTORY   None    HPI    Magaly Baker is a 32 y.o. female who presents to the Emergency Department for non-radiating intermittent chest pain onset yesterday. Patient describes that her episodes of chest pain last about 5 minutes and then goes away and happens a few times per hour. She notes she has a cold that started 4 days ago. She states associated symptoms of sore throat, headache, and minor shortness of breath, but denies vomiting, cough, nausea, or fevers. She denies being pregnant. She denies primary relatives having a history of blood clots. She denies smoking, asthma, or any major health issues. She states her  was sick recently, but denies being around anyone with strep.  No hormone use.  No leg swelling or calf pain.  Her son was recently admitted with RSV.  She received 1 vaccine for COVID.    OUTSIDE HISTORIAN(S):  None    EXTERNAL RECORDS REVIEWED  Outpatient clinic note was reviewed from Urgent Care visit on 10/11/2023 showing patient has a history of anemia.    PAST MEDICAL HISTORY  Anemia    FAMILY HISTORY  Family History   Problem Relation Age of Onset    Other Father         GI problem, GERD       SOCIAL HISTORY  Social History     Tobacco Use    Smoking status: Never     Passive exposure: Never    Smokeless tobacco: Never   Vaping Use    Vaping Use: Never used   Substance Use Topics    Alcohol use: Yes     Comment: rarely    Drug use: No     Social History  "    Substance and Sexual Activity   Drug Use No       SURGICAL HISTORY  Past Surgical History:   Procedure Laterality Date    TONSILLECTOMY         CURRENT MEDICATIONS  No current facility-administered medications for this encounter.    Current Outpatient Medications:     omeprazole (PRILOSEC) 20 MG delayed-release capsule, Take 20 mg by mouth every day., Disp: , Rfl:     ALLERGIES  No Known Allergies    PHYSICAL EXAM  VITAL SIGNS: /70   Pulse 63   Temp 36.8 °C (98.3 °F) (Temporal)   Resp 18   Ht 1.575 m (5' 2\")   Wt 63.5 kg (139 lb 15.9 oz)   LMP 11/08/2023 (Approximate)   SpO2 97%   Breastfeeding No   BMI 25.60 kg/m²   Reviewed and afebrile, no tachypnea, no tachycardia, no hypoxia on room air  Constitutional: Well developed, Well nourished, Hoarse-sounding.  HENT: Normocephalic, atraumatic, bilateral external ears normal, No intraoral erythema, edema, exudate  Eyes: PERRLA, conjunctiva pink, no scleral icterus.   Cardiovascular: Regular rate and rhythm. No murmurs, rubs or gallops.  No dependent edema or calf tenderness  Respiratory: Lungs clear to auscultation bilaterally. No wheezes, rales, or rhonchi.  Abdominal:  Abdomen soft, non-tender, non distended. No rebound, or guarding.    Skin: No erythema, no rash. No wounds or bruising.  Genitourinary: No costovertebral angle tenderness.   Musculoskeletal: no deformities.   Neurologic: Alert & oriented x 3, cranial nerves 2-12 intact by passive exam.  No focal deficit noted.  Psychiatric: Affect normal, Judgment normal, Mood normal.       LABS Ordered and Reviewed by Me:  Results for orders placed or performed during the hospital encounter of 11/16/23   CoV-2, Flu A/B, And RSV by PCR ([facility])    Specimen: Nasopharyngeal; Respirate   Result Value Ref Range    Influenza virus A RNA Negative Negative    Influenza virus B, PCR Negative Negative    RSV, PCR POSITIVE (A) Negative    SARS-CoV-2 by PCR NotDetected     SARS-CoV-2 Source NP Swab        EKG " Interpretation by me    Indication: Chest pain    Rhythm: normal sinus   Rate: Normal at 63  Axis: normal  Ectopy: none  Conduction: normal  ST/T Waves: no acute change  Q Waves: none  R Wave progression: normal  Hypertrophy changes: Absent    Clinical Impression: Normal Sinus Rhythm    RADIOLOGY  I have independently interpreted the DX-Chest associated with this visit demonstrating no pneumonia.  I am awaiting the final reading from the radiologist.     Final Radiology Report  DX-CHEST-LIMITED (1 VIEW)   Final Result      No acute cardiopulmonary abnormality.        Radiologist interpretation have been reviewed by me.       ED COURSE:  5:45 PM - Patient seen and examined at bedside.     ED Observation Status? No, the patient does not meet the criteria for ED Observation.    6:57 - Patient's PCR test results are taking too long. Ordered for D-Dimer. Patient verbalizes understanding and agreement to this plan of care.      7:04 PM - Patient's PCR test came back and she is RSV positive. -D-Dimer order will be cancelled.  I discussed the patient's diagnostic study results. Patient is low risk for life-threatening symptoms and will be discharged.I discussed plan for discharge and follow up as outlined below. The patient is stable for discharge at this time and will return for any new or worsening symptoms. Patient verbalizes understanding and support with my plan for discharge.      MEDICAL DECISION MAKING:  PROBLEMS EVALUATED THIS VISIT:    Patient presents with chest pain and a viral syndrome.  I was initially concerned she may have a pneumonia or pneumothorax without was not present on chest x-ray.  She is low risk for PE by PERC criteria.  Her risk would have been increased if she had COVID and I plan to add a D-dimer if her COVID test was positive but this resulted negative.  Patient is low risk for coronary artery disease and has a unremarkable EKG and a heart score of 0.    RISK:  Low      PLAN:    We could not  find a dangerous cause of your pain.  It is likely related to your RSV infection.  You are low risk for blood clot. Rest and drink fluids and take ibuprofen and/or Tylenol as needed for pain.       Nonspecific Chest Pain Adult handout given    Rest, fluids, ibuprofen and Tylenol    Return to the ER for changing chest pain, shortness of breath, rapid heart rate or painful leg swelling.  None of this is expected.     Followup:  You Roe M.D.  [address]  [address]  [facility] [id number] [phone]    Schedule an appointment as soon as possible for a visit   As needed    CONDITION: Good.     FINAL IMPRESSION  1. Other chest pain    2. RSV infection       Simi YAP (Lillianibhernesto), am scribing for, and in the presence of, Cezar Fonseca M.D..    Electronically signed by: Simi Carpenter (Scribhernesto), 11/16/2023    Cezar YAP M.D. personally performed the services described in this documentation, as scribed by Simi Carpenter in my presence, and it is both accurate and complete.    The note accurately reflects work and decisions made by me.  Cezar Fonseca M.D.  11/16/2023  8:12 PM

## 2023-11-17 NOTE — ED TRIAGE NOTES
"Chief Complaint   Patient presents with    Chest Pain    Cough    Sore Throat     31 yo female ambulates to triage with reports of cold like symptoms since Saturday.  Patient reports non radiating chest pain left upper chest since yesterday.  Reports some SOB but thinks this is related to her cold symptoms. Denies N/V or fever     /70   Pulse 63   Temp 36.8 °C (98.3 °F) (Temporal)   Resp 18   Ht 1.575 m (5' 2\")   Wt 63.5 kg (139 lb 15.9 oz)   LMP 11/08/2023 (Approximate)   SpO2 97%   Breastfeeding No   BMI 25.60 kg/m²     "

## 2024-05-02 NOTE — PROGRESS NOTES
Medication Management Service  [facility]  [phone]    Visit Date: 5/2/2024   Subjective:       Rah Andino is a 34 y.o. male who presents to clinic today for anticoagulation monitoring and adjustment.    Patient seen in clinic for warfarin management due to  Indication:   mechanical aortic valve.   INR goal: of 2.0-3.0.  Duration of therapy: indefinite.    Patient reports the following:   Adherent with regimen  Missed or extra doses:  None   Bleeding or thromboembolic side effects:  None  Significant medication changes:  motrin 400mg x1  Significant dietary changes: None  Significant alcohol or tobacco changes: None  Significant recent illness, disease state changes, or hospitalization:  None  Upcoming surgeries or procedures:  None  Falls: None           Assessment and Plan     PT/INR done in office per protocol.   INR today is 3.4, supratherapeutic.          Plan: Decrease weekly dose ~4% to warfarin 7.5mg daily except 10mg on Mondays     Recheck INR in 1.5 week(s).     Patient verbalized understanding of dosing directions and information discussed. Dosing schedule given to patient including phone number, appointment date, and time. Progress note sent to referring office.    Electronically signed by Eloise Hamm RPH on 5/2/24     For Pharmacy Admin Tracking Only    Intervention Detail: Dose Adjustment: 1, reason: Therapy De-escalation  Total # of Interventions Recommended: 1  Total # of Interventions Accepted: 1  Time Spent (min): 15     Bedside report received from BRO Capps&CHEVY RN. Pt in bed resting comfortably at this time. Pt able to get up to restroom and void, however advised to call for first time up to bathroom for assistance with pericare education and to ensure patient is steady on her feet, pt states need for pain medication at this time 2/10. Pt states that she will call if additional pain medication is needed. Whiteboard updated. POC discussed. Call light within reach. All questions and concerns answered at this time, advised to call for assistance as needed.

## 2024-09-06 ENCOUNTER — HOSPITAL ENCOUNTER (EMERGENCY)
Facility: MEDICAL CENTER | Age: 34
End: 2024-09-06
Attending: EMERGENCY MEDICINE
Payer: COMMERCIAL

## 2024-09-06 VITALS
RESPIRATION RATE: 15 BRPM | HEART RATE: 61 BPM | DIASTOLIC BLOOD PRESSURE: 68 MMHG | SYSTOLIC BLOOD PRESSURE: 112 MMHG | TEMPERATURE: 98.1 F | BODY MASS INDEX: 23.94 KG/M2 | WEIGHT: 130.07 LBS | OXYGEN SATURATION: 99 % | HEIGHT: 62 IN

## 2024-09-06 DIAGNOSIS — R07.9 CHEST PAIN, UNSPECIFIED TYPE: ICD-10-CM

## 2024-09-06 LAB
ALBUMIN SERPL BCP-MCNC: 4.6 G/DL (ref 3.2–4.9)
ALBUMIN/GLOB SERPL: 1.7 G/DL
ALP SERPL-CCNC: 47 U/L (ref 30–99)
ALT SERPL-CCNC: 17 U/L (ref 2–50)
ANION GAP SERPL CALC-SCNC: 13 MMOL/L (ref 7–16)
AST SERPL-CCNC: 16 U/L (ref 12–45)
BASOPHILS # BLD AUTO: 0.7 % (ref 0–1.8)
BASOPHILS # BLD: 0.05 K/UL (ref 0–0.12)
BILIRUB SERPL-MCNC: 0.6 MG/DL (ref 0.1–1.5)
BUN SERPL-MCNC: 12 MG/DL (ref 8–22)
CALCIUM ALBUM COR SERPL-MCNC: 9 MG/DL (ref 8.5–10.5)
CALCIUM SERPL-MCNC: 9.5 MG/DL (ref 8.4–10.2)
CHLORIDE SERPL-SCNC: 102 MMOL/L (ref 96–112)
CO2 SERPL-SCNC: 25 MMOL/L (ref 20–33)
CREAT SERPL-MCNC: 0.58 MG/DL (ref 0.5–1.4)
EKG IMPRESSION: NORMAL
EOSINOPHIL # BLD AUTO: 0.13 K/UL (ref 0–0.51)
EOSINOPHIL NFR BLD: 1.9 % (ref 0–6.9)
ERYTHROCYTE [DISTWIDTH] IN BLOOD BY AUTOMATED COUNT: 41.6 FL (ref 35.9–50)
GFR SERPLBLD CREATININE-BSD FMLA CKD-EPI: 123 ML/MIN/1.73 M 2
GLOBULIN SER CALC-MCNC: 2.7 G/DL (ref 1.9–3.5)
GLUCOSE SERPL-MCNC: 85 MG/DL (ref 65–99)
HCT VFR BLD AUTO: 46.3 % (ref 37–47)
HGB BLD-MCNC: 14.9 G/DL (ref 12–16)
IMM GRANULOCYTES # BLD AUTO: 0.03 K/UL (ref 0–0.11)
IMM GRANULOCYTES NFR BLD AUTO: 0.4 % (ref 0–0.9)
LYMPHOCYTES # BLD AUTO: 2.23 K/UL (ref 1–4.8)
LYMPHOCYTES NFR BLD: 33 % (ref 22–41)
MCH RBC QN AUTO: 29.2 PG (ref 27–33)
MCHC RBC AUTO-ENTMCNC: 32.2 G/DL (ref 32.2–35.5)
MCV RBC AUTO: 90.8 FL (ref 81.4–97.8)
MONOCYTES # BLD AUTO: 0.43 K/UL (ref 0–0.85)
MONOCYTES NFR BLD AUTO: 6.4 % (ref 0–13.4)
NEUTROPHILS # BLD AUTO: 3.88 K/UL (ref 1.82–7.42)
NEUTROPHILS NFR BLD: 57.6 % (ref 44–72)
NRBC # BLD AUTO: 0 K/UL
NRBC BLD-RTO: 0 /100 WBC (ref 0–0.2)
PLATELET # BLD AUTO: 207 K/UL (ref 164–446)
PMV BLD AUTO: 11.6 FL (ref 9–12.9)
POTASSIUM SERPL-SCNC: 3.9 MMOL/L (ref 3.6–5.5)
PROT SERPL-MCNC: 7.3 G/DL (ref 6–8.2)
RBC # BLD AUTO: 5.1 M/UL (ref 4.2–5.4)
SODIUM SERPL-SCNC: 140 MMOL/L (ref 135–145)
TROPONIN T SERPL-MCNC: <6 NG/L (ref 6–19)
WBC # BLD AUTO: 6.8 K/UL (ref 4.8–10.8)

## 2024-09-06 PROCEDURE — 85025 COMPLETE CBC W/AUTO DIFF WBC: CPT

## 2024-09-06 PROCEDURE — 93005 ELECTROCARDIOGRAM TRACING: CPT | Performed by: EMERGENCY MEDICINE

## 2024-09-06 PROCEDURE — 84484 ASSAY OF TROPONIN QUANT: CPT

## 2024-09-06 PROCEDURE — 99283 EMERGENCY DEPT VISIT LOW MDM: CPT

## 2024-09-06 PROCEDURE — 80053 COMPREHEN METABOLIC PANEL: CPT

## 2024-09-06 PROCEDURE — 93005 ELECTROCARDIOGRAM TRACING: CPT

## 2024-09-06 ASSESSMENT — HEART SCORE
HEART SCORE: 0
TROPONIN: LESS THAN OR EQUAL TO NORMAL LIMIT
RISK FACTORS: NO KNOWN RISK FACTORS
ECG: NORMAL
AGE: <45
HISTORY: SLIGHTLY SUSPICIOUS

## 2024-09-06 ASSESSMENT — FIBROSIS 4 INDEX: FIB4 SCORE: 0.85

## 2024-09-06 NOTE — ED PROVIDER NOTES
"ED Provider Note    CHIEF COMPLAINT  Chief Complaint   Patient presents with    Chest Pain       HPI  Magaly Baker is a 33 y.o. female who presents for evaluation of chest pain that present on the left side of her chest and was a sharp sensation while she was driving.  The entire episode lasted about 5 minutes however the pain was \"off and on\" during that time.  She noted no shortness of breath, nausea, sweating, or radiation of the pain.  She does not smoke and does not use drugs.  She is otherwise healthy and is on no medications.  She does have a Mirena IUD.  EXTERNAL RECORDS REVIEWED  Noted last ED visit November 2023 for chest pain  ROS  Constitutional: No fevers or chills  Skin: No rashes  HEENT: No sore throat, or runny nose  Neck: No neck pain  Chest: Chest pain as noted.  None currently.  Pulm: No shortness of breath, cough, wheezing, stridor, or pain with inspiration/expiration  Gastrointestinal: No nausea, vomiting,  or abdominal pain.  Musculoskeletal: No pain, swelling, or focal weakness  Neurologic: No sensory or focal motor changes to extremities.   Heme: No bleeding or bruising problems.   Immuno: No hx of recurrent infections        LIMITATION TO HISTORY   None   OUTSIDE HISTORIAN(S):  None        PAST FAM HISTORY  Family History   Problem Relation Age of Onset    Other Father         GI problem, GERD       PAST MEDICAL HISTORY   No significant past medical history    SOCIAL HISTORY  Social History     Tobacco Use    Smoking status: Never     Passive exposure: Never    Smokeless tobacco: Never   Vaping Use    Vaping status: Never Used   Substance and Sexual Activity    Alcohol use: Not Currently     Comment: rarely    Drug use: No    Sexual activity: Yes     Partners: Male     Birth control/protection: I.U.D.       SURGICAL HISTORY   has a past surgical history that includes tonsillectomy.    CURRENT MEDICATIONS  Home Medications    **Home medications have not yet been reviewed for this " "encounter**          ALLERGIES  No Known Allergies    PHYSICAL EXAM  VITAL SIGNS: /68   Pulse 61   Temp 36.7 °C (98.1 °F) (Temporal)   Resp 15   Ht 1.575 m (5' 2\")   Wt 59 kg (130 lb 1.1 oz)   SpO2 99%   BMI 23.79 kg/m²    Gen: Alert in no apparent distress.  HEENT: No signs of trauma, Bilateral external ears normal, Nose normal. Conjunctiva normal, Non-icteric.    Cardiovascular: Regular rate and rhythm, no murmurs.    Thorax & Lungs: Normal breath sounds, No respiratory distress, No wheezing bilateral chest rise  Abdomen: No distention  Skin: Warm, Dry, No erythema, No rash noted to exposed areas.   Back: No bony tenderness, No CVA tenderness.   Neurologic: Alert , no facial droop, grossly normal coordination and strength  Psychiatric: Affect pleasant    INITIAL IMPRESSION  Patient arrives for evaluation of brief episode of intermittent chest pain which was sharp in did not have any significant associated symptoms to suggest ACS.  She is on Mirena IUD but as the symptoms did not last, it seems very unlikely this is related to a PE.  She appears nontoxic and nondistressed and has absolutely no symptoms currently.  Given her heart score of 0, I do not feel she will need inpatient rule out but I will result the CBC, CMP, and troponin.  She has no abnormal breath sounds and no murmur.  I do not feel chest x-ray will benefit her  in this setting.  Likely this was musculoskeletal but patient states clear understanding that this cannot be proven or disproven.  Will reevaluate after labs have returned.    ED observation? No    LABS  Results for orders placed or performed during the hospital encounter of 09/06/24   TROPONIN   Result Value Ref Range    Troponin T <6 6 - 19 ng/L   CBC WITH DIFFERENTIAL   Result Value Ref Range    WBC 6.8 4.8 - 10.8 K/uL    RBC 5.10 4.20 - 5.40 M/uL    Hemoglobin 14.9 12.0 - 16.0 g/dL    Hematocrit 46.3 37.0 - 47.0 %    MCV 90.8 81.4 - 97.8 fL    MCH 29.2 27.0 - " 33.0 pg    MCHC 32.2 32.2 - 35.5 g/dL    RDW 41.6 35.9 - 50.0 fL    Platelet Count 207 164 - 446 K/uL    MPV 11.6 9.0 - 12.9 fL    Neutrophils-Polys 57.60 44.00 - 72.00 %    Lymphocytes 33.00 22.00 - 41.00 %    Monocytes 6.40 0.00 - 13.40 %    Eosinophils 1.90 0.00 - 6.90 %    Basophils 0.70 0.00 - 1.80 %    Immature Granulocytes 0.40 0.00 - 0.90 %    Nucleated RBC 0.00 0.00 - 0.20 /100 WBC    Neutrophils (Absolute) 3.88 1.82 - 7.42 K/uL    Lymphs (Absolute) 2.23 1.00 - 4.80 K/uL    Monos (Absolute) 0.43 0.00 - 0.85 K/uL    Eos (Absolute) 0.13 0.00 - 0.51 K/uL    Baso (Absolute) 0.05 0.00 - 0.12 K/uL    Immature Granulocytes (abs) 0.03 0.00 - 0.11 K/uL    NRBC (Absolute) 0.00 K/uL   COMP METABOLIC PANEL   Result Value Ref Range    Sodium 140 135 - 145 mmol/L    Potassium 3.9 3.6 - 5.5 mmol/L    Chloride 102 96 - 112 mmol/L    Co2 25 20 - 33 mmol/L    Anion Gap 13.0 7.0 - 16.0    Glucose 85 65 - 99 mg/dL    Bun 12 8 - 22 mg/dL    Creatinine 0.58 0.50 - 1.40 mg/dL    Calcium 9.5 8.4 - 10.2 mg/dL    Correct Calcium 9.0 8.5 - 10.5 mg/dL    AST(SGOT) 16 12 - 45 U/L    ALT(SGPT) 17 2 - 50 U/L    Alkaline Phosphatase 47 30 - 99 U/L    Total Bilirubin 0.6 0.1 - 1.5 mg/dL    Albumin 4.6 3.2 - 4.9 g/dL    Total Protein 7.3 6.0 - 8.2 g/dL    Globulin 2.7 1.9 - 3.5 g/dL    A-G Ratio 1.7 g/dL   ESTIMATED GFR   Result Value Ref Range    GFR (CKD-EPI) 123 >60 mL/min/1.73 m 2   EKG   Result Value Ref Range    Report       [facility] Emergency Dept.    Test Date:  2024  Pt Name:    ISIDORO EPPS               Department: [facility]  MRN:        [id number]                      Room:  Gender:     Female                       Technician: FLORECITA  :        1990                   Requested By:ER TRIAGE PROTOCOL  Order #:    [id number]                    Reading MD:    Measurements  Intervals                                Axis  Rate:       59                           P:          69  SC:         129                           QRS:        75  QRSD:       101                          T:          33  QT:         441  QTc:        437    Interpretive Statements  Sinus rhythm  Borderline T abnormalities, anterior leads  Baseline wander in lead(s) V1  Compared to ECG 11/16/2023 17:13:56  T-wave abnormality now present       I have independently interpreted this EKG  Sinus rhythm rate of 59, intervals appear to be within normal limits.  There are no convincing ST or T wave changes to suggest ischemia, there is no ectopy.  There are no convincing changes when compared to an EKG performed in November 2023.      ASSESSMENT, COURSE AND PLAN  Care Narrative: Patient's workup was reassuring here and there was no evidence for ACS, infectious issue, or any other emergent problem.  She remained symptom-free and, given her heart score of 0, I feel she is safe for outpatient follow-up.  She states understanding that if symptoms worsen or change she would need to return for reevaluation, and otherwise to discuss the symptoms with her primary care provider.      HEART Score: 0     ADDITIONAL PROBLEMS MANAGED    Pertinent Labs & Imaging studies reviewed. (See chart for details)      I have discussed management of the patient with the following physicians and YENIFER's: None    Escalation of care considered, and ultimately not performed: inpatient evaluation and chest pain rule out    Barriers to care at this time, including but not limited to: .  None    Decision tools and Rx drugs considered including, but not limited to : None    Discussion of management with other Q or appropriate source(s): None  The patient will return for worsening symptoms and is stable at the time of discharge. The patient verbalizes understanding and will comply.    FINAL IMPRESSION  1. Chest pain, unspecified type        Electronically signed by: Sudeep Chandler M.D., 9/6/2024 3:04 PM

## 2024-09-06 NOTE — ED TRIAGE NOTES
Pt states this afternoon started having L sided CP that she described as sharp. Pt states it got worse when she took a deep breath in. States lasted about 5 mins. Called the triage nurse line who told her to come here. Pt states she does not have any chest pain now and denies SOB

## 2025-01-14 ENCOUNTER — OFFICE VISIT (OUTPATIENT)
Dept: MEDICAL GROUP | Facility: MEDICAL CENTER | Age: 35
End: 2025-01-14
Payer: COMMERCIAL

## 2025-01-14 VITALS
OXYGEN SATURATION: 98 % | DIASTOLIC BLOOD PRESSURE: 58 MMHG | TEMPERATURE: 99 F | HEIGHT: 62 IN | BODY MASS INDEX: 23.79 KG/M2 | HEART RATE: 88 BPM | SYSTOLIC BLOOD PRESSURE: 106 MMHG

## 2025-01-14 DIAGNOSIS — Z00.00 ANNUAL PHYSICAL EXAM: ICD-10-CM

## 2025-01-14 DIAGNOSIS — F41.9 ANXIETY: ICD-10-CM

## 2025-01-14 DIAGNOSIS — D64.9 ANEMIA, UNSPECIFIED TYPE: ICD-10-CM

## 2025-01-14 DIAGNOSIS — E78.00 ELEVATED LDL CHOLESTEROL LEVEL: ICD-10-CM

## 2025-01-14 DIAGNOSIS — Z13.1 SCREENING FOR DIABETES MELLITUS: ICD-10-CM

## 2025-01-14 DIAGNOSIS — N64.4 BREAST PAIN: ICD-10-CM

## 2025-01-14 DIAGNOSIS — R79.82 ELEVATED C-REACTIVE PROTEIN (CRP): ICD-10-CM

## 2025-01-14 PROCEDURE — 99395 PREV VISIT EST AGE 18-39: CPT | Performed by: FAMILY MEDICINE

## 2025-01-14 PROCEDURE — 3074F SYST BP LT 130 MM HG: CPT | Performed by: FAMILY MEDICINE

## 2025-01-14 PROCEDURE — 99214 OFFICE O/P EST MOD 30 MIN: CPT | Mod: 25 | Performed by: FAMILY MEDICINE

## 2025-01-14 PROCEDURE — 3078F DIAST BP <80 MM HG: CPT | Performed by: FAMILY MEDICINE

## 2025-01-14 RX ORDER — PROPRANOLOL HYDROCHLORIDE 10 MG/1
10 TABLET ORAL EVERY 8 HOURS PRN
Qty: 30 TABLET | Refills: 1 | Status: SHIPPED | OUTPATIENT
Start: 2025-01-14

## 2025-01-14 RX ORDER — TRETINOIN 0.25 MG/G
CREAM TOPICAL NIGHTLY
COMMUNITY

## 2025-01-14 RX ORDER — LEVONORGESTREL 52 MG/1
1 INTRAUTERINE DEVICE INTRAUTERINE ONCE
COMMUNITY

## 2025-01-14 RX ORDER — CLINDAMYCIN PHOSPHATE AND BENZOYL PEROXIDE 10; 50 MG/G; MG/G
GEL TOPICAL
COMMUNITY

## 2025-01-14 ASSESSMENT — ENCOUNTER SYMPTOMS
VOMITING: 0
SHORTNESS OF BREATH: 0
CONSTIPATION: 0
DIZZINESS: 0
MYALGIAS: 0
EYE REDNESS: 0
SINUS PAIN: 0
SORE THROAT: 0
CHILLS: 0
COUGH: 0
FEVER: 0
EYE PAIN: 0
SPUTUM PRODUCTION: 0
HEADACHES: 0
DIARRHEA: 0
EYE DISCHARGE: 0
SENSORY CHANGE: 0
PALPITATIONS: 0
ABDOMINAL PAIN: 0
WHEEZING: 0
HEMOPTYSIS: 0
NAUSEA: 0
DEPRESSION: 0
FOCAL WEAKNESS: 0

## 2025-01-14 ASSESSMENT — PATIENT HEALTH QUESTIONNAIRE - PHQ9: CLINICAL INTERPRETATION OF PHQ2 SCORE: 0

## 2025-01-14 NOTE — LETTER
[facility]  You Roe M.D.  [address]  [place] [address]  Fax: [phone]   Authorization for Release/Disclosure of   Protected Health Information   Name: MAGALY EPPS : 1990 SSN: [id number]   Address: [facility] [address] [place] [address] Phone:    [phone] (home)    I authorize the entity listed below to release/disclose the PHI below to:   [facility]/You Roe M.D. and You Roe M.D.   Provider or Entity Name:     Address   City, State, Zip   Phone:      Fax:     Reason for request: continuity of care   Information to be released:    [  ] LAST COLONOSCOPY,  including any PATH REPORT and follow-up  [  ] LAST FIT/COLOGUARD RESULT [  ] LAST DEXA  [  ] LAST MAMMOGRAM  [  ] LAST PAP  [  ] LAST LABS [  ] RETINA EXAM REPORT  [  ] IMMUNIZATION RECORDS  [  ] Release all info      [  ] Check here and initial the line next to each item to release ALL health information INCLUDING  _____ Care and treatment for drug and / or alcohol abuse  _____ HIV testing, infection status, or AIDS  _____ Genetic Testing    DATES OF SERVICE OR TIME PERIOD TO BE DISCLOSED: _____________  I understand and acknowledge that:  * This Authorization may be revoked at any time by you in writing, except if your health information has already been used or disclosed.  * Your health information that will be used or disclosed as a result of you signing this authorization could be re-disclosed by the recipient. If this occurs, your re-disclosed health information may no longer be protected by State or Federal laws.  * You may refuse to sign this Authorization. Your refusal will not affect your ability to obtain treatment.  * This Authorization becomes effective upon signing and will  on (date) __________.      If no date is indicated, this Authorization will  one (1) year from the signature date.    Name: Magaly Epps  Signature: Date:   2025     PLEASE FAX REQUESTED  RECORDS BACK TO: [phone]

## 2025-01-14 NOTE — PROGRESS NOTES
Verbal consent was acquired by the patient to use [employer] ambient listening note generation during this visit     Assessment/Plan:    Magaly was seen today for annual exam.    Diagnoses and all orders for this visit:    Annual physical exam    Screening for diabetes mellitus  -     HEMOGLOBIN A1C; Future    Elevated LDL cholesterol level  -     Comp Metabolic Panel; Future  -     Lipid Profile; Future    Elevated C-reactive protein (CRP)  -     Sed Rate; Future  -     CRP QUANTITIVE (NON-CARDIAC); Future    Anemia, unspecified type  -     CBC WITH DIFFERENTIAL; Future  -     IRON/TOTAL IRON BIND; Future  -     FERRITIN; Future    Breast pain  -     US-BREAST BILAT-COMPLETE; Future    Anxiety  -     propranolol (INDERAL) 10 MG Tab; Take 1 Tablet by mouth every 8 hours as needed (anxiety).         Assessment & Plan  1.  Bilateral breast pain  New condition, stable, symptoms has resolved  - Etiology of breast pain remains uncertain, possibly premenstrual  - Ultrasound of both breasts ordered if pain recurs    2. Acne  - Unclear if Mirena IUD is contributing to acne exacerbation  - Advised to consult gynecologist regarding alternative contraceptive methods  - Currently using tretinoin 0.025% gel and clindamycin/benzoyl peroxide/Differin gel as prescribed by dermatologist    3.  Anemia  Chronic condition, stable  - Previously stable iron levels, now taking iron supplements due to low levels  - Iron and ferritin levels to be checked to assess current status    4.  Elevated LDL cholesterol and elevated CRP  Chronic condition, stable, recheck labs.      5.  Anxiety symptoms  Chronic condition, unstable due to stress at work, propranolol prescribed to be used as needed.  If not effective we can try BuSpar or Atarax.      Annual physical  - Colonoscopy last year was normal  - Received all 3 HPV vaccines, with the third one administered in 2023  - Declined influenza vaccine at this time  - Advised to receive COVID-19  vaccine at local pharmacy  - Pap smear results to be requested from gynecologist  -Anticipatory guidance discussed below in the note    Follow-up in 1 year for annual physical sooner as needed.      Chief Complaint.    Chief Complaint   Patient presents with    Annual Exam     Pt requesting new labs  Bilateral breast pain x 2 weeks   Records request for [facility]         History of Present Illness  The patient is a 34-year-old female who presents for an annual physical, breast pain, and laboratory tests.    Breast Pain  - Reports experiencing an unusual breast pain, likened to the sensation of milk letdown during breastfeeding  - Discomfort persisted for several days before resolving three days ago  - Does not report any palpable masses or lymph node enlargement  - No family history of breast cancer    Severe Acne Breakouts  - Experiencing severe acne breakouts  - Dermatologist prescribed medication and recommended birth control  - Hesitant to use birth control due to family history of stroke in her grandmother  - Currently using Mirena IUD for contraception, resulting in cessation of menstrual cycle with occasional spotting  - Prescribed tretinoin gel 0.025%, clindamycin, benzoyl peroxide, and Differin  .    FAMILY HISTORY  - Grandmother had a stroke that resulted in death  - No family history of breast cancer      Health Maintenance  Advanced directive: n/a  Osteoporosis Screen/ DEXA: n/a  PT/vit D for falls prevention: n/a   Cholesterol Screening:   Lab Results   Component Value Date/Time    CHOLSTRLTOT 182 10/10/2023 0938    TRIGLYCERIDE 53 10/10/2023 0938    HDL 45 10/10/2023 0938     (H) 10/10/2023 0938      Diabetes Screening:   Lab Results   Component Value Date/Time    HBA1C 5.0 10/10/2023 09:38 AM       AAA Screening (65 to 74 year male): n/a   Aspirin Use: n/a    Below anticipatory guidance discussed with patient  Diet:  counseled regarding eating healthy diet  Exercise: Counseled regarding  exercise 150 minutes in a week    Cancer screening  Colorectal Cancer Screening: Had colonoscopy, was normal  Lung Cancer Screening: She does not smoke  Cervical Cancer Screening: Request Pap smear records  Breast Cancer Screening: Ordered breast ultrasound due to symptoms    Infectious disease screening/Immunizations  --STI Screening: None  --Immunizations: - Received all 3 HPV vaccines, with the third one administered in 2023  - Declined influenza vaccine at this time  - Advised to receive COVID-19 vaccine at local pharmacy       Review of Systems   Constitutional:  Negative for chills, fever and malaise/fatigue.   HENT:  Negative for ear discharge, ear pain, hearing loss, sinus pain and sore throat.    Eyes:  Negative for pain, discharge and redness.   Respiratory:  Negative for cough, hemoptysis, sputum production, shortness of breath and wheezing.    Cardiovascular:  Negative for chest pain, palpitations and leg swelling.   Gastrointestinal:  Negative for abdominal pain, constipation, diarrhea, nausea and vomiting.   Genitourinary:  Negative for dysuria, frequency and urgency.   Musculoskeletal:  Negative for joint pain and myalgias.   Skin:  Negative for itching and rash.   Neurological:  Negative for dizziness, sensory change, focal weakness and headaches.   Psychiatric/Behavioral:  Negative for depression.         Anxiety symptoms sometimes            Current Outpatient Medications   Medication Sig Dispense Refill    levonorgestrel (MIRENA, 52 MG,) 20 MCG/DAY IUD 1 Each by Intrauterine route one time.      tretinoin (RETIN-A) 0.025 % cream Apply  topically every evening.      Clindamycin-Benzoyl Per, Refr, 1.2-5 % Gel Apply  topically.      propranolol (INDERAL) 10 MG Tab Take 1 Tablet by mouth every 8 hours as needed (anxiety). 30 Tablet 1     No current facility-administered medications for this visit.        No Known Allergies      /58 (BP Location: Left arm, Patient Position: Sitting, BP Cuff Size:  "Adult)   Pulse 88   Temp 37.2 °C (99 °F) (Temporal)   Ht 1.575 m (5' 2\")   SpO2 98%  Body mass index is 23.79 kg/m².      Physical Exam  Constitutional:       Appearance: Normal appearance. She is well-developed and well-groomed.   HENT:      Head: Normocephalic and atraumatic.      Right Ear: Tympanic membrane, ear canal and external ear normal.      Left Ear: Tympanic membrane, ear canal and external ear normal.      Nose: Nose normal.      Mouth/Throat:      Mouth: Mucous membranes are moist.      Pharynx: No oropharyngeal exudate or posterior oropharyngeal erythema.   Eyes:      General:         Right eye: No discharge.         Left eye: No discharge.      Conjunctiva/sclera: Conjunctivae normal.   Neck:      Thyroid: No thyromegaly.   Cardiovascular:      Rate and Rhythm: Normal rate and regular rhythm.      Heart sounds: Normal heart sounds. No murmur heard.  Pulmonary:      Effort: Pulmonary effort is normal. No respiratory distress.      Breath sounds: Normal breath sounds. No wheezing or rales.   Abdominal:      General: There is no distension.   Musculoskeletal:         General: Normal range of motion.      Cervical back: Neck supple.      Right lower leg: No edema.      Left lower leg: No edema.   Skin:     General: Skin is warm.      Findings: No rash.   Neurological:      General: No focal deficit present.      Mental Status: She is alert. Mental status is at baseline.      Gait: Gait is intact.   Psychiatric:         Mood and Affect: Mood and affect normal.         Behavior: Behavior normal.        Results             Annual physical covers screening, immunizations,counseling.  Other problem list that was addressed during this visit including diagnostic labs, diagnostic imagings, medications will be billed as a regular visit which can have copay per patient insurance.        Please note that this dictation was created using voice recognition software. I have made every reasonable attempt to correct " obvious errors, but I expect that there are errors of grammar and possibly content that I did not discover before finalizing the note.

## 2025-01-15 DIAGNOSIS — N64.4 BREAST PAIN: ICD-10-CM

## 2025-05-18 NOTE — PROGRESS NOTES
Summary: Magaly has been pumping 7x daily, yielding 32-35oz every 24 hours. Feeding baby 22oz every 24 hours.  Today: Fed baby 4oz of expressed breastmilk mixed with prune juice, pacing well. Discussed plan moving forward.   Plan: Pump 7x every 24 hours, see suggested routine below. Feed baby every 2-3 hours, mostly 2.5 hours during the day. No need to wake baby for nighttime feeding. Baby needs 20-22oz every 24 hours. Attempting to slightly decrease overall intake to slow baby's weight slightly and decrease gas pains and discomfort.    Follow up:   Lactation appointment: As Needed and Breastfeeding [place]   Baby 's Provider appointment: 2 Month Well Child Check   Referrals: Michaela Chahal, Therapist     Subjective:     Magaly Baker is a 32 y.o. female here for lactation care. She is here today with  babyFernando .    Concerns: Discuss exclusive pumping    HPI:   Pertinent  history:       FEEDING HISTORY:    Previous Breastfeeding History: First baby.   Hospital Course: Attempted to breastfeed, had difficulty latching. Started pumping and syringe feeding. Attempted to latch bare breast and with the nipple shield, both with a lot of difficulty.   Prior to consultation on 3/23/2023: Pumping every 2-3 hours, occasionally 4 hours during the day. Up to 6 hours during the night. Averaging 140mls up to 260mls. Feeding baby every 3 hours day and night, offering 3oz at each feeding. Increased to 4oz today.   Currently 4/3/2023: Pumping 7x daily, yielding 32-35oz every 24 hours. Feeding baby 22oz every 24 hours.    Both breasts: No     Supplement: Supplementation initiated inpatient and Expressed breast milk  Quantity: 3-4oz  How given/devices:  Bottle  Bottle/nipple type: ComoTomo    Nipple Shield Use: None    Breast Pumping:  Frequency: 7x  Quantity Obtained: 32-35oz / 24 hours   Type of Pump: Medela and MomCozy  Pain with pumping when starting     Maternal ROS:  Constitutional: No fever, chills. Feeling  well  Breasts: No soreness of breasts and No soreness of nipples  Psychiatric: Managing ok  Mental Health: No mention of feeling irritable, agitated, angry, overwhelmed, apathetic, appetite changes, exhausted nor having sleep changes outside infant feeds/demands.  Current Outpatient Medications on File Prior to Visit   Medication Sig Dispense Refill    docusate sodium 100 MG Cap Take 100 mg by mouth 2 times a day as needed for Constipation. 60 Capsule 0    ibuprofen (MOTRIN) 800 MG Tab Take 1 Tablet by mouth every 8 hours as needed for Moderate Pain. 30 Tablet 0    Prenatal Vit-Fe Fumarate-FA (PRENATAL VITAMIN PO) Take  by mouth.       No current facility-administered medications on file prior to visit.      No past medical history on file.      Objective:     Maternal Physical Lactation Exam  General: No acute distress  Breasts: Symmetrical  and Soft  Nipples: intact and bleb  Psychiatric: Normal mood and affect. Her behavior is normal. Judgment and thought content normal.  Mental Health: Did NOT exhibit sadness, crying, feeling overwhelmed, agitation or hypervigilance.    Assessment/Plan & Lactation Counseling:     Infant Exam on Infant Chart    Infant Weight History:   02/26/2023: 6# 12.3oz  03/13/2023: 6# 12oz (Ped)  03/23/2023: 7# 10.9oz  04/03/2023: 8# 5.9oz     Suck Pattern on the Bottle: Suck burst and normal rest  Behavior Following Observed Feeding: content    Milk Supply Available: oversupply, regulating       INFANT BREASTFEEDING PLAN  Discussed with family present detailed plan for establishing/maintaining family specific goals with breastfeeding available on Mom’s My Chart   Infant specific:     MATERNAL PERSONALIZED BREASTFEEDING PLAN  Discussed concerns and symptoms as listed above in assessment and guidance summarized below. Shared decision making was used between myself and the family for this encounter, home care, and follow up. Topics reviewed included:   Feeding:   Infant feeding well given  current interval growth, guidelines to follow:  Feed your baby every 2-3 hours, more often if baby acts hungry.   Awaken baby for feeding if going over 3 hours in the day.   No need to wake baby for nighttime feedings   Bottles    Baby needs 20-22oz every 24 hours  Pumping Guidelines:  Both breasts 7x daily   Type of Pump:  Medela and MomCozy  How long will vary woman to woman, typically 10-15 minutes, or 1-2 minutes after flow slows  Flange Fit: Freely moving nipple in the tunnel with some movement of the areola.  Caution with Breast Massage The word “Massage” means different things in the breastfeeding world. It is described and interpreted in so many different ways and unfortunately potentially harmful. The hands can be safe on a breast and  gentle to help in many ways but they also can be damaging when used in the wrong way.  Lymphatic drainage massage is appropriate,  open hand , lightly stroking only, and can be highly effective. The massage advice to knead , massage deeply , vibrate with marketed tools is  most concerning. Be gentle with this gland to not increase inflammation.   Storage (Acceptable guidelines for healthy term babies)  10 hours at room temp including your pieces, may rinse but not mandatory  8 days refrigerator, don't need  to refrigerate right away if using fresh pumped milk at the next feeding  Freezer 1 year  Deep freezer 2 years  American Academy or Pediatrics has said you may mix different temperature milks.   If baby drinks breastmilk from a fresh or refrigerated bottle of breastmilk,  you may return the unused portion to the refrigerator and use once at next feeding.   Connect with other mothers:  Facebook:   [place] [facility]: [email or web address]  Well-Nourished Babies (Private group for questions and support): https://www.facebook.com/groups/280569498645619/  Breastfeeding [place] including opportunity to weight your baby and do before and after weights  WEIGHT CHECKS  Tuesdays 10am - 11am. [facility] at [address] [place] [address], [address], 3rd floor conference room  Check your baby's weight, do a feeding and see how your baby is growing, visit with other mothers, plan on a walk or coffee date after group.  Please download Growth: Baby and Child for Apple or Child Growth Tracker for Androids if you would like to  document and follow your baby's growth curve.  Please wear a mask coming and going: you may remove it in the classroom  Due to space limitations - limit strollers please (New c/section moms please use your stroller).  We would love to have dads stay, but moms won't breastfeed if there are men in the room, sorry.  The room is generally scheduled for another event following group.  Please take all diapers with you   ONLINE SUPPORT GROUPS  [employer] ([employer]) support groups are conducted using a [id number] support model and are not intended for those experiencing a mental health crisis. Groups are 90 minutes (1.5 hours) in length. The first ~30 minutes is providing information, education, and establishing group guidelines. The next ~60 minutes is “talk time,” in which group members share and talk with each other. Group members must be present for the group guidelines before joining in the discussion or “talk time.”         In Conclusion:   Managing breastfeeding and milk supply is very dynamic,can be a complex and intimate journey, and is not one size fits all. When obstacles present themselves, it takes confidence, persistence and support. The rights of the child include optimal nutrition and mothers need help to make informed decisions. You  and your baby have been screened for biological, psychological, and social risk factors that might interfere with achieving your goals.  Support is critical. We want the family thriving not just tolerating life. You are now the focus of our Breastfeeding Medicine team; we are here to support your  decisions and vision.     Follow up requires close monitoring in this time sensitive window of opportunity to establish milk supply and facilitate the learning of  breastfeeding.    Please call [phone] our voicemail line or the front office at [phone] to scheduled your next appointment.  Family is encouraged to e-mail or mychart us to update how the plan is working.    A total of 75 minutes, including infant assessment time, with more than 50% was spent preparing to see the patient, obtaining and reviewing separately obtained history, performing a medically appropriate examination and evaluation, counseling and educating the family, referring and communicating with other health care professionals, documenting clinical information in the electronic health record, independently interpreting weighted feeds and infant growth results, communicating these results to the family and care coordination as detailed in the above note.       Niru Knapp     Time-based billing (NON-critical care)

## 2025-06-26 ENCOUNTER — HOSPITAL ENCOUNTER (OUTPATIENT)
Dept: RADIOLOGY | Facility: MEDICAL CENTER | Age: 35
End: 2025-06-26
Attending: FAMILY MEDICINE
Payer: COMMERCIAL

## 2025-06-26 ENCOUNTER — RESULTS FOLLOW-UP (OUTPATIENT)
Dept: MEDICAL GROUP | Facility: MEDICAL CENTER | Age: 35
End: 2025-06-26

## 2025-06-26 DIAGNOSIS — N64.4 BREAST PAIN: ICD-10-CM

## 2025-06-26 PROCEDURE — G0279 TOMOSYNTHESIS, MAMMO: HCPCS
